# Patient Record
Sex: MALE | Race: WHITE | NOT HISPANIC OR LATINO | Employment: FULL TIME | URBAN - METROPOLITAN AREA
[De-identification: names, ages, dates, MRNs, and addresses within clinical notes are randomized per-mention and may not be internally consistent; named-entity substitution may affect disease eponyms.]

---

## 2020-11-17 ENCOUNTER — OFFICE VISIT - HEALTHEAST (OUTPATIENT)
Dept: FAMILY MEDICINE | Facility: CLINIC | Age: 45
End: 2020-11-17

## 2020-11-17 DIAGNOSIS — R05.9 COUGH: ICD-10-CM

## 2020-11-17 DIAGNOSIS — J32.0 MAXILLARY SINUSITIS, UNSPECIFIED CHRONICITY: ICD-10-CM

## 2020-11-17 DIAGNOSIS — Z71.6 ENCOUNTER FOR SMOKING CESSATION COUNSELING: ICD-10-CM

## 2020-11-17 RX ORDER — BENZONATATE 100 MG/1
100 CAPSULE ORAL EVERY 6 HOURS PRN
Qty: 30 CAPSULE | Refills: 0 | Status: SHIPPED | OUTPATIENT
Start: 2020-11-17

## 2020-11-17 RX ORDER — NICOTINE 21 MG/24HR
1 PATCH, TRANSDERMAL 24 HOURS TRANSDERMAL EVERY 24 HOURS
Qty: 30 PATCH | Refills: 0 | Status: SHIPPED | OUTPATIENT
Start: 2020-11-17

## 2020-11-17 RX ORDER — ACETAMINOPHEN 325 MG/1
650 TABLET ORAL EVERY 6 HOURS PRN
Status: SHIPPED | COMMUNITY
Start: 2020-11-17

## 2020-11-19 ENCOUNTER — COMMUNICATION - HEALTHEAST (OUTPATIENT)
Dept: SCHEDULING | Facility: CLINIC | Age: 45
End: 2020-11-19

## 2021-05-27 VITALS
RESPIRATION RATE: 16 BRPM | TEMPERATURE: 98.4 F | DIASTOLIC BLOOD PRESSURE: 78 MMHG | HEART RATE: 105 BPM | SYSTOLIC BLOOD PRESSURE: 119 MMHG | OXYGEN SATURATION: 95 %

## 2021-06-13 NOTE — PATIENT INSTRUCTIONS - HE
There were no signs of pneumonia.    Your symptoms are likely due to acute bronchitis.  This is inflammation of the tubes leading into the lungs, most often due to a viral infection and an antibiotic will not help this.    You may take Tessalon Perles every six hours as needed for cough.  May also try to use Mucinex or Robitussin to help for cough supressant.    Please monitor symptoms carefully.  If developing chest pain, shortness of breath, fever, coughing up blood, extreme fatigue, or any other new, concerning symptoms, come back to clinic or go to ER immediately.  Otherwise, if no improvement in symptoms in one week, follow-up with your primary care provider.    You will need to self-isolate until your COVID-19 test results are known.  If your results are negative, you can return to work.  If the results are positive, you will need to self-isolate for a period of 14 days from exposure date.    You will received a follow-up call to schedule an appointment with a primary provider.  You mention concern about COPD, and establishing care with a primary provider will help determine whether COPD is present or not.    You are prescribed Augmentin to be taken for treatment of sinusitis.  Please follow-up with primary provider to ensure that this condition is resolving as well.

## 2021-06-13 NOTE — PROGRESS NOTES
Chief Complaint   Patient presents with     Cough     [id number],      Fatigue     x1-1.5wks, was exposed to covid positive person at work last week, bodyaches, HA     Night Sweats     last night     Shortness of Breath     x2wks       HPI:    Jake Rodriguez is a 45 y.o. male who presents today [place] [facility] complaining of cough and shortness of breath for 2 months, increasing fatigue and body aches over the past 7 to 10 days, headache and night sweats last night.  He has not had a documented fever.  Patient denies sore throat, ear pain.  He does note that he has occasional chest pain when he has bouts of coughing, but that chest pain resolves when the coughing spell has ended.  The poatient has had a yellow-tinged sputum that has persisted for 2-months.  He does not have change in sense of taste or smell sensation; he states he has had decreased sense of smell for years.  Patient was treated for pneumonia on 10/18/2020, but notes that he did not receive a steroid with that treatment and wonders if that is why the symptoms have persisted.  He was treated with doxycycline and cough suppressant.  He feels that that treatment was ineffective, given that symptoms persist.      The patient notes that he had exposure to a coworker who tested positive for COVID about 8 days ago.  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      History obtained from the patient.    Problem List:  There are no relevant problems documented for this patient.      No past medical history on file.    Social History     Tobacco Use     Smoking status: Current Every Day Smoker     Types: Cigarettes     Smokeless tobacco: Never Used   Substance Use Topics     Alcohol use: Not on file       Review of Systems   Constitutional: Positive for fatigue.        Night sweats last night.  No fever documented.   HENT: Positive for congestion,  postnasal drip, rhinorrhea, sinus pressure and sinus pain. Negative for ear pain, sore throat, trouble swallowing and voice change.    Eyes: Negative for photophobia, discharge and redness.   Respiratory: Positive for cough and shortness of breath. Negative for chest tightness and wheezing.         Occasional chest pain that occurs with vigorous coughing.  No chest pain at this time.  Shortness of breath for two months has not increased recently.    Cardiovascular: Negative for palpitations.   Gastrointestinal: Negative for diarrhea and nausea.   Neurological: Positive for headaches. Negative for dizziness.        Headache last night which has resolved.    Hematological: Positive for adenopathy.   Psychiatric/Behavioral: Negative.        Vitals:    11/17/20 0903   BP: 119/78   Patient Site: Right Arm   Patient Position: Sitting   Cuff Size: Adult Regular   Pulse: (!) 105   Resp: 16   Temp: 98.4  F (36.9  C)   TempSrc: Oral   SpO2: 95%       Physical Exam  Constitutional:       Appearance: Normal appearance. He is not ill-appearing, toxic-appearing or diaphoretic.   HENT:      Head: Normocephalic and atraumatic.      Nose: Congestion and rhinorrhea present.      Comments: Reports yellow drainage from nose.  Yellow drainage in sputum following coughing episodes.      Mouth/Throat:      Mouth: Mucous membranes are moist.      Pharynx: Posterior oropharyngeal erythema present. No oropharyngeal exudate.   Eyes:      Conjunctiva/sclera: Conjunctivae normal.      Pupils: Pupils are equal, round, and reactive to light.   Neck:      Musculoskeletal: Normal range of motion and neck supple.   Cardiovascular:      Rate and Rhythm: Normal rate and regular rhythm.      Pulses: Normal pulses.      Heart sounds: Normal heart sounds. No murmur.   Pulmonary:      Effort: Pulmonary effort is normal.      Breath sounds: Normal breath sounds. No wheezing or rales.   Chest:      Chest wall: No tenderness.   Skin:     General: Skin is  warm and dry.      Capillary Refill: Capillary refill takes less than 2 seconds.   Neurological:      General: No focal deficit present.      Mental Status: He is alert.   Psychiatric:         Mood and Affect: Mood normal.         Labs:    COVID-19 results are pending.    Radiology:  I have personally ordered and preliminarily reviewed the following xray, I have noted that there are not findings indicating pneumonia for the   Xr Chest 2 Views    Result Date: 11/17/2020  EXAM DATE:         11/17/2020 EXAM: X-RAY CHEST, 2 VIEWS, FRONTAL AND LATERAL LOCATION: [facility] DATE/TIME: 11/17/2020 9:45 AM INDICATION: cough and sob x 2 months. Increased sputum, fatigue, and sweats more recently in the past 7 days. COMPARISON: PA and lateral views of the chest 10/18/2020 IMPRESSION: Negative chest.       Clinical Decision Making:    Differential diagnoses for this patient include upper respiratory infection, sinusitis, viral pharyngitis, pneumonia, and or COVID-19.  The patient has symptoms of cough, shortness of breath, and congestion for 2 months.  Because he has yellow-tinged sputum intranasal secretions because patient has tenderness over maxillary sinuses bilaterally we will treat the patient for sinusitis with an Augmentin regimen.  The patient is interested in smoking cessation which he will broach with a primary provider. In the meantime he would like 2 separate doses for nicotine patches, as he has had experience of having too much nicotine with the 21 mg patch.  He has been treated previously for sinusitis is and is aware of the antibiotic regimen plan.  He is prescribed a 5-day dose of prednisone.  Finally the patient is aware of the importance of establishing care with a primary provider in order to follow-up on symptoms noted during this examination and to address the need for smoking cessation and monitoring for the possibility of COPD condition.    At the end of the encounter, I discussed  results, diagnosis, medications. Discussed red flags for immediate return to clinic/ER, as well as indications for follow up if no improvement. Patient understood and agreed to plan. Patient was stable for discharge.    1. Cough  Symptomatic COVID-19 Virus (CORONAVIRUS) PCR    XR Chest 2 Views    nicotine (NICODERM CQ) 21 mg/24 hr    predniSONE (DELTASONE) 20 MG tablet    benzonatate (TESSALON PERLES) 100 MG capsule    nicotine (NICODERM CQ) 14 mg/24 hr    amoxicillin-clavulanate (AUGMENTIN) 875-125 mg per tablet   2. Encounter for smoking cessation counseling  nicotine (NICODERM CQ) 14 mg/24 hr    Ambulatory referral to Family Practice   3. Maxillary sinusitis, unspecified chronicity  amoxicillin-clavulanate (AUGMENTIN) 875-125 mg per tablet         Patient Instructions   There were no signs of pneumonia.    Your symptoms are likely due to acute bronchitis.  This is inflammation of the tubes leading into the lungs, most often due to a viral infection and an antibiotic will not help this.    You may take Tessalon Perles every six hours as needed for cough.  May also try to use Mucinex or Robitussin to help for cough supressant.    Please monitor symptoms carefully.  If developing chest pain, shortness of breath, fever, coughing up blood, extreme fatigue, or any other new, concerning symptoms, come back to clinic or go to ER immediately.  Otherwise, if no improvement in symptoms in one week, follow-up with your primary care provider.    You will need to self-isolate until your COVID-19 test results are known.  If your results are negative, you can return to work.  If the results are positive, you will need to self-isolate for a period of 14 days from exposure date.    You will received a follow-up call to schedule an appointment with a primary provider.  You mention concern about COPD, and establishing care with a primary provider will help determine whether COPD is present or not.    You are prescribed Augmentin to  be taken for treatment of sinusitis.  Please follow-up with primary provider to ensure that this condition is resolving as well.

## 2021-06-20 ENCOUNTER — HEALTH MAINTENANCE LETTER (OUTPATIENT)
Age: 46
End: 2021-06-20

## 2021-10-11 ENCOUNTER — HEALTH MAINTENANCE LETTER (OUTPATIENT)
Age: 46
End: 2021-10-11

## 2022-07-17 ENCOUNTER — HEALTH MAINTENANCE LETTER (OUTPATIENT)
Age: 47
End: 2022-07-17

## 2022-09-24 ENCOUNTER — HEALTH MAINTENANCE LETTER (OUTPATIENT)
Age: 47
End: 2022-09-24

## 2022-11-10 ENCOUNTER — VIRTUAL VISIT (OUTPATIENT)
Dept: PSYCHOLOGY | Facility: CLINIC | Age: 47
End: 2022-11-10
Payer: COMMERCIAL

## 2022-11-10 DIAGNOSIS — Z91.199 NO-SHOW FOR APPOINTMENT: Primary | ICD-10-CM

## 2023-08-05 ENCOUNTER — HEALTH MAINTENANCE LETTER (OUTPATIENT)
Age: 48
End: 2023-08-05

## 2023-11-06 ENCOUNTER — HOSPITAL ENCOUNTER (EMERGENCY)
Facility: HOSPITAL | Age: 48
Discharge: HOME | End: 2023-11-06
Attending: EMERGENCY MEDICINE | Admitting: EMERGENCY MEDICINE
Payer: COMMERCIAL

## 2023-11-06 VITALS
RESPIRATION RATE: 20 BRPM | HEART RATE: 105 BPM | DIASTOLIC BLOOD PRESSURE: 93 MMHG | WEIGHT: 172.3 LBS | HEIGHT: 67 IN | BODY MASS INDEX: 27.04 KG/M2 | TEMPERATURE: 97.9 F | OXYGEN SATURATION: 98 % | SYSTOLIC BLOOD PRESSURE: 138 MMHG

## 2023-11-06 DIAGNOSIS — B02.9 HERPES ZOSTER WITHOUT COMPLICATION: ICD-10-CM

## 2023-11-06 PROCEDURE — 99284 EMERGENCY DEPT VISIT MOD MDM: CPT

## 2023-11-06 RX ORDER — OXYCODONE AND ACETAMINOPHEN 5; 325 MG/1; MG/1
1 TABLET ORAL EVERY 6 HOURS PRN
Qty: 12 TABLET | Refills: 0 | Status: SHIPPED | OUTPATIENT
Start: 2023-11-06 | End: 2023-11-09

## 2023-11-06 RX ORDER — VALACYCLOVIR HYDROCHLORIDE 1 G/1
1000 TABLET, FILM COATED ORAL 3 TIMES DAILY
Qty: 21 TABLET | Refills: 0 | Status: SHIPPED | OUTPATIENT
Start: 2023-11-06 | End: 2023-11-13

## 2023-11-06 NOTE — ED PROVIDER NOTES
EMERGENCY DEPARTMENT ENCOUNTER      NAME: Jake Rodriguez  AGE: 48 year old male  YOB: 1975  MRN: [id number]  EVALUATION DATE & TIME: No admission date for patient encounter.    PCP: System, Provider Not In    ED PROVIDER: Jaime Mascorro D.O.      Chief Complaint   Patient presents with    Rash    Flank Pain       FINAL IMPRESSION:  No diagnosis found.    ED COURSE & MEDICAL DECISION MAKIN:28 PM I met with the patient to gather history and to perform my initial exam. I discussed the plan for care while in the Emergency Department.         Pertinent Labs & Imaging studies reviewed. (See chart for details)  48 year old male presents to the Emergency Department for evaluation of right flank pain.  Initial differential did include appendicitis, ureterolithiasis, zoster, other acute process.  Patient does have a vesicular dermatomal rash on the right flank, most consistent with herpes zoster/shingles infection.  No systemic symptoms.  At this time, believe the patient is appropriate for discharge on Valtrex and pain control and have follow-up as an outpatient with primary care provider.  Return precautions were discussed.  Did provide work note for the next several days.    Medical Decision Making    History:  Supplemental history from: Documented in chart, if applicable  External Record(s) reviewed: Documented in chart, if applicable.    Work Up:  Chart documentation includes differential considered and any EKGs or imaging independently interpreted by provider, where specified.  In additional to work up documented, I considered the following work up: Documented in chart, if applicable.    External consultation:  Discussion of management with another provider: Documented in chart, if applicable    Complicating factors:  Care impacted by chronic illness: N/A  Care affected by social determinants of health: N/A    Disposition considerations: Discharge. I prescribed additional prescription strength  "medication(s) as charted. See documentation for any additional details.        At the conclusion of the encounter I discussed the results of all of the tests and the disposition. The questions were answered. The patient or family acknowledged understanding and was agreeable with the care plan.        HPI    Patient information was obtained from: the patient      Jake Rodriguez is a 48 year old male who presents to the emergency room with complaint of right-sided flank pain.  Noticed pain several days ago, 2 days ago started developing a rash on the right flank.  Does report that he has had chickenpox at age 27 from his mother that had zoster at the time.  Denies any chest pain or shortness of breath.  Does not have dysuria or hematuria.  Denies additional plaints at this time.        PAST MEDICAL HISTORY:  No past medical history on file.    PAST SURGICAL HISTORY:  No past surgical history on file.      CURRENT MEDICATIONS:    No current facility-administered medications for this encounter.     Current Outpatient Medications   Medication    acetaminophen (TYLENOL) 325 MG tablet    benzonatate (TESSALON PERLES) 100 MG capsule    nicotine (NICODERM CQ) 21 mg/24 hr         ALLERGIES:  Allergies   Allergen Reactions    Metaproterenol Other (See Comments)     Other reaction(s): *Unknown - Childhood Rxn, Unknown, pt doesn't recall.       FAMILY HISTORY:  No family history on file.    SOCIAL HISTORY:  Social History     Socioeconomic History    Marital status:    Tobacco Use    Smoking status: Every Day     Types: Cigarettes    Smokeless tobacco: Never       VITALS:  Patient Vitals for the past 24 hrs:   BP Temp Temp src Pulse Resp SpO2 Height Weight   11/06/23 1132 (!) 138/93 97.9  F (36.6  C) Oral 105 20 98 % -- --   11/06/23 1127 (!) 157/74 98  F (36.7  C) Oral 81 20 92 % 1.702 m (5' 7\") 78.2 kg (172 lb 4.8 oz)       PHYSICAL EXAM    Vitals: BP (!) 138/93   Pulse 105   Temp 97.9  F (36.6  C) (Oral)   Resp " "20   Ht 1.702 m (5' 7\")   Wt 78.2 kg (172 lb 4.8 oz)   SpO2 98%   BMI 26.99 kg/m    General: Appears in no acute distress, awake, alert, interactive.  Eyes: Conjunctivae non-injected. Sclera anicteric.  HENT: Atraumatic, normocephalic  Neck: Supple, normal ROM  Respiratory/Chest: Respiration unlabored, no tachypnea  Abdomen: non distended  Musculoskeletal: Normal extremities. No edema or erythema.  Skin: Dermatomal vesicular rash of the right flank   Neurologic: Alert and oriented, face symmetric, moves all extremities spontaneously. Speech clear.          MEDICATIONS GIVEN IN THE EMERGENCY:  Medications - No data to display    NEW PRESCRIPTIONS STARTED AT TODAY'S ER VISIT  New Prescriptions    No medications on file          Jaime Mascorro D.O.  Emergency Medicine  [facility] EMERGENCY DEPARTMENT  [address] [place] [id number] [phone]  Dept: [phone]       Jaime Mascorro,   11/06/23 1234    "

## 2023-11-06 NOTE — ED TRIAGE NOTES
Presents with red rash that wraps around the right flank, quite painful at times but currently rated 4/10. Concerned for shingles. Tiny open area noted on the rash lesion noted nearest the umbilicus. Rash present x 4 days.     Of note patient also had a fall out of bed on Wednesday and has contusion under the right axilla and may have aggrivated that area on Saturday while playing basketball; denies continued pain related to this.

## 2023-11-06 NOTE — Clinical Note
Jake Rodriguez was seen and treated in our emergency department on 11/6/2023.  He may return to work on 11/09/2023.       If you have any questions or concerns, please don't hesitate to call.      Jaime Mascorro, DO

## 2024-09-22 ENCOUNTER — HEALTH MAINTENANCE LETTER (OUTPATIENT)
Age: 49
End: 2024-09-22

## 2024-11-05 ENCOUNTER — OFFICE VISIT (OUTPATIENT)
Dept: FAMILY MEDICINE | Facility: CLINIC | Age: 49
End: 2024-11-05
Payer: COMMERCIAL

## 2024-11-05 VITALS
HEART RATE: 102 BPM | WEIGHT: 254.5 LBS | SYSTOLIC BLOOD PRESSURE: 110 MMHG | RESPIRATION RATE: 18 BRPM | HEIGHT: 74 IN | DIASTOLIC BLOOD PRESSURE: 80 MMHG | BODY MASS INDEX: 32.66 KG/M2 | TEMPERATURE: 97.6 F | OXYGEN SATURATION: 98 %

## 2024-11-05 DIAGNOSIS — R06.02 SHORT OF BREATH ON EXERTION: ICD-10-CM

## 2024-11-05 DIAGNOSIS — Z12.11 SCREEN FOR COLON CANCER: ICD-10-CM

## 2024-11-05 DIAGNOSIS — Z00.00 ROUTINE GENERAL MEDICAL EXAMINATION AT A HEALTH CARE FACILITY: Primary | ICD-10-CM

## 2024-11-05 DIAGNOSIS — Z11.59 NEED FOR HEPATITIS B SCREENING TEST: ICD-10-CM

## 2024-11-05 DIAGNOSIS — M79.89 LOCALIZED SWELLING OF LOWER EXTREMITY: ICD-10-CM

## 2024-11-05 DIAGNOSIS — Z83.6 FAMILY HISTORY OF PULMONARY FIBROSIS: ICD-10-CM

## 2024-11-05 DIAGNOSIS — Z11.4 SCREENING FOR HIV (HUMAN IMMUNODEFICIENCY VIRUS): ICD-10-CM

## 2024-11-05 DIAGNOSIS — F19.90 SUBSTANCE USE DISORDER: ICD-10-CM

## 2024-11-05 DIAGNOSIS — R19.8 TENESMUS: ICD-10-CM

## 2024-11-05 DIAGNOSIS — K64.8 INTERNAL HEMORRHOID: ICD-10-CM

## 2024-11-05 DIAGNOSIS — Z11.59 NEED FOR HEPATITIS C SCREENING TEST: ICD-10-CM

## 2024-11-05 DIAGNOSIS — R00.0 TACHYCARDIA: ICD-10-CM

## 2024-11-05 DIAGNOSIS — Z72.0 NICOTINE USE: ICD-10-CM

## 2024-11-05 LAB
ANION GAP SERPL CALCULATED.3IONS-SCNC: 10 MMOL/L (ref 7–15)
BUN SERPL-MCNC: 13.5 MG/DL (ref 6–20)
CALCIUM SERPL-MCNC: 9.1 MG/DL (ref 8.8–10.4)
CHLORIDE SERPL-SCNC: 103 MMOL/L (ref 98–107)
CHOLEST SERPL-MCNC: 165 MG/DL
CREAT SERPL-MCNC: 1.09 MG/DL (ref 0.67–1.17)
EGFRCR SERPLBLD CKD-EPI 2021: 84 ML/MIN/1.73M2
FASTING STATUS PATIENT QL REPORTED: YES
GLUCOSE SERPL-MCNC: 99 MG/DL (ref 70–99)
GLUCOSE SERPL-MCNC: 99 MG/DL (ref 70–99)
HBV SURFACE AB SERPL IA-ACNC: <3.5 M[IU]/ML
HBV SURFACE AB SERPL IA-ACNC: NONREACTIVE M[IU]/ML
HCO3 SERPL-SCNC: 25 MMOL/L (ref 22–29)
HCV AB SERPL QL IA: NONREACTIVE
HDLC SERPL-MCNC: 33 MG/DL
HIV 1+2 AB+HIV1 P24 AG SERPL QL IA: NONREACTIVE
LDLC SERPL CALC-MCNC: 106 MG/DL
NONHDLC SERPL-MCNC: 132 MG/DL
NT-PROBNP SERPL-MCNC: 101 PG/ML (ref 0–450)
POTASSIUM SERPL-SCNC: 4.1 MMOL/L (ref 3.4–5.3)
SODIUM SERPL-SCNC: 138 MMOL/L (ref 135–145)
TRIGL SERPL-MCNC: 132 MG/DL

## 2024-11-05 PROCEDURE — 83880 ASSAY OF NATRIURETIC PEPTIDE: CPT

## 2024-11-05 PROCEDURE — 87389 HIV-1 AG W/HIV-1&-2 AB AG IA: CPT

## 2024-11-05 PROCEDURE — 36415 COLL VENOUS BLD VENIPUNCTURE: CPT

## 2024-11-05 PROCEDURE — 86803 HEPATITIS C AB TEST: CPT

## 2024-11-05 PROCEDURE — 80048 BASIC METABOLIC PNL TOTAL CA: CPT

## 2024-11-05 PROCEDURE — 80061 LIPID PANEL: CPT

## 2024-11-05 PROCEDURE — 99214 OFFICE O/P EST MOD 30 MIN: CPT | Mod: 25

## 2024-11-05 PROCEDURE — 86706 HEP B SURFACE ANTIBODY: CPT

## 2024-11-05 PROCEDURE — 99386 PREV VISIT NEW AGE 40-64: CPT

## 2024-11-05 RX ORDER — BUPROPION HYDROCHLORIDE 150 MG/1
150 TABLET ORAL EVERY MORNING
Qty: 90 TABLET | Refills: 2 | Status: SHIPPED | OUTPATIENT
Start: 2024-11-05 | End: 2025-02-03

## 2024-11-05 RX ORDER — NICOTINE 21 MG/24HR
1 PATCH, TRANSDERMAL 24 HOURS TRANSDERMAL EVERY 24 HOURS
Qty: 90 PATCH | Refills: 1 | Status: SHIPPED | OUTPATIENT
Start: 2024-11-05 | End: 2025-05-04

## 2024-11-05 SDOH — HEALTH STABILITY: PHYSICAL HEALTH: ON AVERAGE, HOW MANY MINUTES DO YOU ENGAGE IN EXERCISE AT THIS LEVEL?: 60 MIN

## 2024-11-05 SDOH — HEALTH STABILITY: PHYSICAL HEALTH: ON AVERAGE, HOW MANY DAYS PER WEEK DO YOU ENGAGE IN MODERATE TO STRENUOUS EXERCISE (LIKE A BRISK WALK)?: 2 DAYS

## 2024-11-05 ASSESSMENT — SOCIAL DETERMINANTS OF HEALTH (SDOH): HOW OFTEN DO YOU GET TOGETHER WITH FRIENDS OR RELATIVES?: THREE TIMES A WEEK

## 2024-11-05 NOTE — PATIENT INSTRUCTIONS
Thank you for seeing us at  [facility].     To Review:  If you are getting lab work today, we will send you a ComVibehart message with recommendations once these are all returned to us.  X-rays are able to be performed in clinic and we will notify you of the results.  Any other imaging is scheduled and you will be contacted by the scheduling department.  If you do not hear from them in 2 weeks, please call [phone]   If you are getting immunizations today, you may have some arm soreness; you can use tylenol or ibuprofen for this.    An E visit is an excellent way to get quick evaluation from myself. These can be completed using the [employer] or online using [email or web address]. We can evaluate a variety of conditions using this including sinusitis, skin conditions, etc. Please send us a [email or web address] Message or call if having issues or questions.    Ru Cho DO, MS  [facility]  [phone]    Patient Education   Preventive Care Advice   This is general advice given by our system to help you stay healthy. However, your care team may have specific advice just for you. Please talk to your care team about your preventive care needs.  Nutrition  Eat 5 or more servings of fruits and vegetables each day.  Try wheat bread, brown rice and whole grain pasta (instead of white bread, rice, and pasta).  Get enough calcium and vitamin D. Check the label on foods and aim for 100% of the RDA (recommended daily allowance).  Lifestyle  Exercise at least 150 minutes each week  (30 minutes a day, 5 days a week).  Do muscle strengthening activities 2 days a week. These help control your weight and prevent disease.  No smoking.  Wear sunscreen to prevent skin cancer.  Have a dental exam and cleaning every 6 months.  Yearly exams  See your health care team every year to talk about:  Any changes in your health.  Any medicines your care team has prescribed.  Preventive care, family planning, and  ways to prevent chronic diseases.  Shots (vaccines)   HPV shots (up to age 26), if you've never had them before.  Hepatitis B shots (up to age 59), if you've never had them before.  COVID-19 shot: Get this shot when it's due.  Flu shot: Get a flu shot every year.  Tetanus shot: Get a tetanus shot every 10 years.  Pneumococcal, hepatitis A, and RSV shots: Ask your care team if you need these based on your risk.  Shingles shot (for age 50 and up)  General health tests  Diabetes screening:  Starting at age 35, Get screened for diabetes at least every 3 years.  If you are younger than age 35, ask your care team if you should be screened for diabetes.  Cholesterol test: At age 39, start having a cholesterol test every 5 years, or more often if advised.  Bone density scan (DEXA): At age 50, ask your care team if you should have this scan for osteoporosis (brittle bones).  Hepatitis C: Get tested at least once in your life.  STIs (sexually transmitted infections)  Before age 24: Ask your care team if you should be screened for STIs.  After age 24: Get screened for STIs if you're at risk. You are at risk for STIs (including HIV) if:  You are sexually active with more than one person.  You don't use condoms every time.  You or a partner was diagnosed with a sexually transmitted infection.  If you are at risk for HIV, ask about PrEP medicine to prevent HIV.  Get tested for HIV at least once in your life, whether you are at risk for HIV or not.  Cancer screening tests  Cervical cancer screening: If you have a cervix, begin getting regular cervical cancer screening tests starting at age 21.  Breast cancer scan (mammogram): If you've ever had breasts, begin having regular mammograms starting at age 40. This is a scan to check for breast cancer.  Colon cancer screening: It is important to start screening for colon cancer at age 45.  Have a colonoscopy test every 10 years (or more often if you're at risk) Or, ask your provider  about stool tests like a FIT test every year or Cologuard test every 3 years.  To learn more about your testing options, visit:   .  For help making a decision, visit:   https://bit.ly/ql16700.  Prostate cancer screening test: If you have a prostate, ask your care team if a prostate cancer screening test (PSA) at age 55 is right for you.  Lung cancer screening: If you are a current or former smoker ages 50 to 80, ask your care team if ongoing lung cancer screenings are right for you.  For informational purposes only. Not to replace the advice of your health care provider. Copyright   2023 [employer]. All rights reserved. Clinically reviewed by the [employer] [facility]. Exercise.com [id number] - REV 01/24.  Learning About Stress  What is stress?     Stress is your body's response to a hard situation. Your body can have a physical, emotional, or mental response. Stress is a fact of life for most people, and it affects everyone differently. What causes stress for you may not be stressful for someone else.  A lot of things can cause stress. You may feel stress when you go on a job interview, take a test, or run a race. This kind of short-term stress is normal and even useful. It can help you if you need to work hard or react quickly. For example, stress can help you finish an important job on time.  Long-term stress is caused by ongoing stressful situations or events. Examples of long-term stress include long-term health problems, ongoing problems at work, or conflicts in your family. Long-term stress can harm your health.  How does stress affect your health?  When you are stressed, your body responds as though you are in danger. It makes hormones that speed up your heart, make you breathe faster, and give you a burst of energy. This is called the fight-or-flight stress response. If the stress is over quickly, your body goes back to normal and no harm is done.  But if stress happens too often or  lasts too long, it can have bad effects. Long-term stress can make you more likely to get sick, and it can make symptoms of some diseases worse. If you tense up when you are stressed, you may develop neck, shoulder, or low back pain. Stress is linked to high blood pressure and heart disease.  Stress also harms your emotional health. It can make you buckley, tense, or depressed. Your relationships may suffer, and you may not do well at work or school.  What can you do to manage stress?  You can try these things to help manage stress:   Do something active. Exercise or activity can help reduce stress. Walking is a great way to get started. Even everyday activities such as housecleaning or yard work can help.  Try yoga or shyanne chi. These techniques combine exercise and meditation. You may need some training at first to learn them.  Do something you enjoy. For example, listen to music or go to a movie. Practice your hobby or do volunteer work.  Meditate. This can help you relax, because you are not worrying about what happened before or what may happen in the future.  Do guided imagery. Imagine yourself in any setting that helps you feel calm. You can use online videos, books, or a teacher to guide you.  Do breathing exercises. For example:  From a standing position, bend forward from the waist with your knees slightly bent. Let your arms dangle close to the floor.  Breathe in slowly and deeply as you return to a standing position. Roll up slowly and lift your head last.  Hold your breath for just a few seconds in the standing position.  Breathe out slowly and bend forward from the waist.  Let your feelings out. Talk, laugh, cry, and express anger when you need to. Talking with supportive friends or family, a counselor, or a todd leader about your feelings is a healthy way to relieve stress. Avoid discussing your feelings with people who make you feel worse.  Write. It may help to write about things that are bothering  "you. This helps you find out how much stress you feel and what is causing it. When you know this, you can find better ways to cope.  What can you do to prevent stress?  You might try some of these things to help prevent stress:  Manage your time. This helps you find time to do the things you want and need to do.  Get enough sleep. Your body recovers from the stresses of the day while you are sleeping.  Get support. Your family, friends, and community can make a difference in how you experience stress.  Limit your news feed. Avoid or limit time on social media or news that may make you feel stressed.  Do something active. Exercise or activity can help reduce stress. Walking is a great way to get started.  Where can you learn more?  Go to https://www.[employer]net/patiented  Enter N032 in the search box to learn more about \"Learning About Stress.\"  Current as of: October 24, 2023  Content Version: 14.2 2024 [employer].   Care instructions adapted under license by your healthcare professional. If you have questions about a medical condition or this instruction, always ask your healthcare professional. Healthwise, Incorporated disclaims any warranty or liability for your use of this information.    Substance Use Disorder: Care Instructions  Overview     You can improve your life and health by stopping your use of alcohol or drugs. When you don't drink or use drugs, you may feel and sleep better. You may get along better with your family, friends, and coworkers. There are medicines and programs that can help with substance use disorder.  How can you care for yourself at home?  Here are some ways to help you stay sober and prevent relapse.  If you have been given medicine to help keep you sober or reduce your cravings, be sure to take it exactly as prescribed.  Talk to your doctor about programs that can help you stop using drugs or drinking alcohol.  Do not keep alcohol or drugs in your home.  Plan ahead. " Think about what you'll say if other people ask you to drink or use drugs. Try not to spend time with people who drink or use drugs.  Use the time and money spent on drinking or drugs to do something that's important to you.  Preventing a relapse  Have a plan to deal with relapse. Learn to recognize changes in your thinking that lead you to drink or use drugs. Get help before you start to drink or use drugs again.  Try to stay away from situations, friends, or places that may lead you to drink or use drugs.  If you feel the need to drink alcohol or use drugs again, seek help right away. Call a trusted friend or family member. Some people get support from organizations such as Narcotics Anonymous or [employer] or from treatment facilities.  If you relapse, get help as soon as you can. Some people make a plan with another person that outlines what they want that person to do for them if they relapse. The plan usually includes how to handle the relapse and who to notify in case of relapse.  Don't give up. Remember that a relapse doesn't mean that you have failed. Use the experience to learn the triggers that lead you to drink or use drugs. Then quit again. Recovery is a lifelong process. Many people have several relapses before they are able to quit for good.  Follow-up care is a key part of your treatment and safety. Be sure to make and go to all appointments, and call your doctor if you are having problems. It's also a good idea to know your test results and keep a list of the medicines you take.  When should you call for help?   Call 911  anytime you think you may need emergency care. For example, call if you or someone else:    Has overdosed or has withdrawal signs. Be sure to tell the emergency workers that you are or someone else is using or trying to quit using drugs. Overdose or withdrawal signs may include:  Losing consciousness.  Seizure.  Seeing or hearing things that aren't there (hallucinations).      "Is thinking or talking about suicide or harming others.   Where to get help 24 hours a day, 7 days a week   If you or someone you know talks about suicide, self-harm, a mental health crisis, a substance use crisis, or any other kind of emotional distress, get help right away. You can:    Call the Suicide and Crisis Lifeline at 988.     Call 9-685-222-TALK (1-920.872.8618).     Text HOME to 194200 to access the Crisis Text Line.   Consider saving these numbers in your phone.  Go to [employer] for more information or to chat online.  Call your doctor now or seek immediate medical care if:    You are having withdrawal symptoms. These may include nausea or vomiting, sweating, shakiness, and anxiety.   Watch closely for changes in your health, and be sure to contact your doctor if:    You have a relapse.     You need more help or support to stop.   Where can you learn more?  Go to https://www.[employer].net/patiented  Enter H573 in the search box to learn more about \"Substance Use Disorder: Care Instructions.\"  Current as of: November 15, 2023  Content Version: 14.2 2024 [email or web address].   Care instructions adapted under license by your healthcare professional. If you have questions about a medical condition or this instruction, always ask your healthcare professional. Healthwise, Incorporated disclaims any warranty or liability for your use of this information.    Safer Sex: Care Instructions  Overview  Safer sex is a way to reduce your risk of getting a sexually transmitted infection (STI). It can also help prevent pregnancy.  Several products can help you practice safer sex and reduce your chance of STIs. One of the best is a condom. There are internal and external condoms. You can use a special rubber sheet (dental dam) for protection during oral sex. Disposable gloves can keep your hands from touching blood, semen, or other body fluids that can carry infections.  Remember that birth control methods " such as diaphragms, IUDs, foams, and birth control pills do not stop you from getting STIs.  Follow-up care is a key part of your treatment and safety. Be sure to make and go to all appointments, and call your doctor if you are having problems. It's also a good idea to know your test results and keep a list of the medicines you take.  How can you care for yourself at home?  Think about getting vaccinated to help prevent hepatitis A, hepatitis B, and human papillomavirus (HPV). They can be spread through sex.  Use a condom every time you have sex. Use an external condom, which goes on the penis. Or use an internal condom, which goes into the vagina or anus.  Make sure you use the right size external condom. A condom that's too small can break easily. A condom that's too big can slip off during sex.  Use a new condom each time you have sex. Be careful not to poke a hole in the condom when you open the wrapper.  Don't use an internal condom and an external condom at the same time.  Never use petroleum jelly (such as Vaseline), grease, hand lotion, baby oil, or anything with oil in it. These products can make holes in the condom.  After intercourse, hold the edge of the condom as you remove it. This will help keep semen from spilling out of the condom.  Do not have sex with anyone who has symptoms of an STI, such as sores on the genitals or mouth.  Do not drink a lot of alcohol or use drugs before sex.  Limit your sex partners. Sex with one partner who has sex only with you can reduce your risk of getting an STI.  Don't share sex toys. But if you do share them, use a condom and clean the sex toys between each use.  Talk to any partners before you have sex. Talk about what you feel comfortable with and whether you have any boundaries with sex. And find out if your partner or partners may be at risk for any STI. Keep in mind that a person may be able to spread an STI even if they do not have symptoms. You and any partners  "may want to get tested for STIs.  Where can you learn more?  Go to https://www.[employer].net/patiented  Enter B608 in the search box to learn more about \"Safer Sex: Care Instructions.\"  Current as of: November 27, 2023  Content Version: 14.2 2024 [facility].   Care instructions adapted under license by your healthcare professional. If you have questions about a medical condition or this instruction, always ask your healthcare professional. Healthwise, Incorporated disclaims any warranty or liability for your use of this information.       "

## 2024-11-05 NOTE — PROGRESS NOTES
Preventive Care Visit  [facility]  Ru Cho DO, Family Medicine  Nov 5, 2024      Assessment & Plan     Routine general medical examination at a health care facility  Screening for HIV (human immunodeficiency virus)  Need for hepatitis C screening test  Patient is a 49-year-old male here to establish care  He has acute complaints below  For preventative we will place an order for colonoscopy  Discussed tobacco cessation  Will check A1c, HIV, hepatitis C, lipids  We will send patient a PlayDo message with results  - REVIEW OF HEALTH MAINTENANCE PROTOCOL ORDERS  - Glucose; Future  - Lipid panel reflex to direct LDL Fasting; Future  - HIV Antigen Antibody Combo  - Hepatitis C Screen Reflex to HCV RNA Quant and Genotype    Family history of pulmonary fibrosis  Short of breath on exertion  Localized swelling of lower extremity  Tachycardia  Patient has had Lower extremity swelling with shortness of breath on exertion for the last few years  He does report that he got COVID and he was stuck with a cough for 5 or 6 months  He has a family history of pulmonary fibrosis in his dad around the same age of when he was diagnosed  On physical exam today he does have +1 to +2 swelling to the mid shins  No murmurs are heard however he is tachycardic  I am concerned that the patient may have a component of congestive heart failure versus pulmonary hypertension secondary to pulmonary fibrosis  Will get an echocardiogram to determine EF versus pulmonary arterial pressures  Depending on results of echocardiogram we will send to the appropriate specialists  Discussed with the patient does need to stop using amphetamines as this can cause dilated cardiomyopathy  Smoking could exacerbate his pulmonary disease  - Echocardiogram Complete; Future  - Basic metabolic panel; Future  - BNP-N terminal pro; Future  - Basic metabolic panel  - BNP-N terminal pro    Tenesmus  Internal hemorrhoid  Screen for colon  cancer  Patient has a 20-year history of feeling incomplete rectal emptying  Says that there is occasional straining  No pain associated  Occasional blood around the outside of the bowel movement  Loose to hard stools without diarrhea  Differential diagnosis of IBS versus internal hemorrhoids due to no pain  He is of age that he can get colon cancer screening so we will send him for a colonoscopy  He can try MiraLAX or Metamucil as he has not tried these in the past  - Colonoscopy Screening  Referral; Future    Need for hepatitis B screening test  No known vaccination from Hepatitis B  Will check for antibodies to ensure if immune  If negative, will encourage restarting series    - Hepatitis B Surface Antibody; Future  - Hepatitis B Surface Antibody    Substance use disorder  Nicotine use  Patient currently smokes three quarters of a pack  Previously smoked a pack a day  He is using amphetamines nearly daily whether smoking or eating them  Discussed dangers of this especially with the current concern of pulmonary fibrosis versus congestive heart failure  Preventive start Wellbutrin he will take 1 tablet/day for a week and then increase to 2 tablets a day for a week  We will also start him on nicotine patches as these have helped in the past  This will help with both activating symptoms of amphetamines as well as the psychological component  Discussed NA or therapist that he wants to refrain from these at this time  - buPROPion (WELLBUTRIN XL) 150 MG 24 hr tablet; Take 1 tablet (150 mg) by mouth every morning.    Patient has been advised of split billing requirements and indicates understanding: Yes        Nicotine/Tobacco Cessation  He reports that he has been smoking cigarettes. He started smoking about 32 years ago. He has a 16.4 pack-year smoking history. He has been exposed to tobacco smoke. He has never used smokeless tobacco.  Nicotine/Tobacco Cessation Plan  Pharmacotherapies : bupropion (Zyban) and  "Nicotine patch      BMI  Estimated body mass index is 32.66 kg/m  as calculated from the following:    Height as of this encounter: 1.88 m (6' 2.02\").    Weight as of this encounter: 115.4 kg (254 lb 8 oz).       Counseling  Appropriate preventive services were addressed with this patient via screening, questionnaire, or discussion as appropriate for fall prevention, nutrition, physical activity, Tobacco-use cessation, social engagement, weight loss and cognition.  Checklist reviewing preventive services available has been given to the patient.  Reviewed patient's diet, addressing concerns and/or questions.   He is at risk for lack of exercise and has been provided with information to increase physical activity for the benefit of his well-being.   The patient was instructed to see the dentist every 6 months.       Devonte Joseph is a 49 year old, presenting for the following:  Physical (Fasting. Referral for colonoscopy )        11/5/2024     9:53 AM   Additional Questions   Roomed by loly JONES    Bowel Movements  DESCRIPTION: feeling of not being done or complete, feels like something is in the way. No pain. Occasionally dab of blood but never pooling. Occasional straining. Having a bowel movement every day. Sore by the time he is done. Going 1-2 times per day  DURATION: 20 years  ASSOCIATION SYMPTOMS: straining, blood on outside of stool. Intermixed bristol. No diarrhea problems.  RELIEF TRIALS: no miralax or metamucil  PMH: no family history of colon cancer    Leg Swelling:  Got covid   Cough stuck with him for 5-6 months  Calves and legs swell - swelling all the time - last few years - compression socks and elevation help - make it feel better  Shortness of breath - for years  No chronic cough  No sleeping in chair or needing excessive pillows       5 years - 2 kiddos at home - happy at home   at [facility] Directive  Patient does not have a Health Care " Directive: Discussed advance care planning with patient; however, patient declined at this time.      11/5/2024   General Health   How would you rate your overall physical health? (!) FAIR   Feel stress (tense, anxious, or unable to sleep) Very much      (!) STRESS CONCERN      11/5/2024   Nutrition   Three or more servings of calcium each day? (!) NO   Diet: Other   If other, please elaborate: junk   How many servings of fruit and vegetables per day? (!) 0-1   How many sweetened beverages each day? (!) 4+            11/5/2024   Exercise   Days per week of moderate/strenous exercise 2 days   Average minutes spent exercising at this level 60 min      (!) EXERCISE CONCERN      11/5/2024   Social Factors   Frequency of gathering with friends or relatives Three times a week   Worry food won't last until get money to buy more No   Food not last or not have enough money for food? No   Do you have housing? (Housing is defined as stable permanent housing and does not include staying ouside in a car, in a tent, in an abandoned building, in an overnight shelter, or couch-surfing.) Yes   Are you worried about losing your housing? No   Lack of transportation? No   Unable to get utilities (heat,electricity)? No            11/5/2024   Dental   Dentist two times every year? (!) NO            11/5/2024   TB Screening   Were you born outside of the [place]? No              Today's PHQ-2 Score:       11/5/2024    10:02 AM   PHQ-2 ( 1999 [employer])   Q1: Little interest or pleasure in doing things 0    Q2: Feeling down, depressed or hopeless 1    PHQ-2 Score 1    Q1: Little interest or pleasure in doing things Not at all   Q2: Feeling down, depressed or hopeless Several days   PHQ-2 Score 1       Patient-reported         11/5/2024   Substance Use   Alcohol more than 3/day or more than 7/wk No   Do you use any other substances recreationally? (!) CANNABIS PRODUCTS        Social History     Tobacco Use    Smoking status: Every Day     Types:  "Cigarettes     Passive exposure: Current    Smokeless tobacco: Never   Vaping Use    Vaping status: Never Used             11/5/2024   One time HIV Screening   Previous HIV test? Yes          11/5/2024   STI Screening   New sexual partner(s) since last STI/HIV test? (!) YES       ASCVD Risk   The ASCVD Risk score (Aliya CALDERON, et al., 2019) failed to calculate for the following reasons:    Cannot find a previous HDL lab    Cannot find a previous total cholesterol lab        11/5/2024   Contraception/Family Planning   Questions about contraception or family planning No           Reviewed and updated as needed this visit by Provider                  Review of Systems  Constitutional, HEENT, cardiovascular, pulmonary, gi and gu systems are negative, except as otherwise noted.     Objective    Exam  /80   Pulse 102   Temp 97.6  F (36.4  C) (Oral)   Resp 18   Ht 1.88 m (6' 2.02\")   Wt 115.4 kg (254 lb 8 oz)   SpO2 98%   BMI 32.66 kg/m     Estimated body mass index is 32.66 kg/m  as calculated from the following:    Height as of this encounter: 1.88 m (6' 2.02\").    Weight as of this encounter: 115.4 kg (254 lb 8 oz).    Physical Exam  Vitals reviewed.   Constitutional:       Appearance: Normal appearance.   HENT:      Head: Normocephalic and atraumatic.      Right Ear: Tympanic membrane, ear canal and external ear normal.      Left Ear: Tympanic membrane, ear canal and external ear normal.      Nose: Nose normal.      Mouth/Throat:      Mouth: Mucous membranes are moist.   Eyes:      Extraocular Movements: Extraocular movements intact.      Pupils: Pupils are equal, round, and reactive to light.   Cardiovascular:      Rate and Rhythm: Normal rate and regular rhythm.      Heart sounds: Normal heart sounds.   Pulmonary:      Effort: Pulmonary effort is normal.      Breath sounds: Normal breath sounds.   Abdominal:      General: Abdomen is flat. Bowel sounds are normal.      Palpations: Abdomen is soft. "   Musculoskeletal:         General: Tenderness present.      Cervical back: Normal range of motion and neck supple.      Right lower leg: Edema present.      Left lower leg: Edema present.      Comments: +1+2 edema in the lower extremities bilaterally to the mid shin   Skin:     General: Skin is warm and dry.   Neurological:      General: No focal deficit present.      Mental Status: He is alert.   Psychiatric:         Mood and Affect: Mood normal.         Behavior: Behavior normal.         Signed Electronically by: Ru Cho DO

## 2024-11-05 NOTE — RESULT ENCOUNTER NOTE
I have reviewed your diagnostic work and all appears appropriate.     The marker we use to check for heart failure was not elevated.  I still want to get the echocardiogram of your heart.  Kidneys were within normal limits.  Hepatitis C was negative.  HIV is still pending but we will let you know.  You do not show immunization from hepatitis B.  I would recommend redoing the vaccine series.  This can be done at a local pharmacy or through a nurse visit at [facility].  Your good cholesterol (HDL) is low and your bad cholesterol (LDL) slightly elevated.  Your 10 year risk of a cardiovascular event (heart attack or stroke) is 5.8%. I would recommend lifestyle modifications. Also, ensure you are getting 5 servings of fruits and vegetables daily, 3 servings of dairy daily, and 150 minutes of strenous activity per weeks.    Sincerely,    Dr. Cho

## 2024-11-06 ENCOUNTER — MYC MEDICAL ADVICE (OUTPATIENT)
Dept: FAMILY MEDICINE | Facility: CLINIC | Age: 49
End: 2024-11-06
Payer: COMMERCIAL

## 2024-12-12 ENCOUNTER — MYC MEDICAL ADVICE (OUTPATIENT)
Dept: FAMILY MEDICINE | Facility: CLINIC | Age: 49
End: 2024-12-12

## 2024-12-12 ENCOUNTER — TELEPHONE (OUTPATIENT)
Dept: FAMILY MEDICINE | Facility: CLINIC | Age: 49
End: 2024-12-12

## 2024-12-12 ENCOUNTER — HOSPITAL ENCOUNTER (OUTPATIENT)
Dept: CARDIOLOGY | Facility: CLINIC | Age: 49
End: 2024-12-12
Payer: COMMERCIAL

## 2024-12-12 DIAGNOSIS — M79.89 LOCALIZED SWELLING OF LOWER EXTREMITY: ICD-10-CM

## 2024-12-12 DIAGNOSIS — R93.1 ABNORMAL ECHOCARDIOGRAM: Primary | ICD-10-CM

## 2024-12-12 DIAGNOSIS — R06.02 SHORT OF BREATH ON EXERTION: ICD-10-CM

## 2024-12-12 LAB — LVEF ECHO: NORMAL

## 2024-12-12 PROCEDURE — C8929 TTE W OR WO FOL WCON,DOPPLER: HCPCS

## 2024-12-12 PROCEDURE — 255N000002 HC RX 255 OP 636

## 2024-12-12 RX ADMIN — PERFLUTREN 3 ML: 6.52 INJECTION, SUSPENSION INTRAVENOUS at 08:40

## 2024-12-12 NOTE — TELEPHONE ENCOUNTER
Call to patient and relayed provider message. He was upset that he saw the results before the provider. Provided number for patient to call and schedule chest x-ray. Patient stated understanding.     ----- Message from LAKEISHA RADER sent at 12/12/2024  4:51 PM CST -----    Dr. Cho is out this week    I was reviewing your echo and I am slightly concerned for pulmonary hypertension. This can occur when the blood pressure in the lungs is higher than it should be  I will place a referral to cardiology.    I am also going to order a chest xray of your lungs-you can schedule and imaging appointment for this.

## 2024-12-16 ENCOUNTER — TELEPHONE (OUTPATIENT)
Dept: CARDIOLOGY | Facility: CLINIC | Age: 49
End: 2024-12-16
Payer: COMMERCIAL

## 2024-12-16 NOTE — TELEPHONE ENCOUNTER
LVM re: PH referral for Provider visit, Labs, PFT, 6 minute walk and VQ Scan. Gave Elizabeth office number to schedule.    Raudel VENCES

## 2024-12-18 ENCOUNTER — HOSPITAL ENCOUNTER (OUTPATIENT)
Dept: RADIOLOGY | Facility: CLINIC | Age: 49
Discharge: HOME | End: 2024-12-18
Attending: NURSE PRACTITIONER
Payer: COMMERCIAL

## 2024-12-18 ENCOUNTER — TELEPHONE (OUTPATIENT)
Dept: CARDIOLOGY | Facility: CLINIC | Age: 49
End: 2024-12-18
Payer: COMMERCIAL

## 2024-12-18 DIAGNOSIS — R93.1 ABNORMAL ECHOCARDIOGRAM: ICD-10-CM

## 2024-12-18 DIAGNOSIS — E61.1 IRON DEFICIENCY: ICD-10-CM

## 2024-12-18 DIAGNOSIS — R06.02 SHORT OF BREATH ON EXERTION: ICD-10-CM

## 2024-12-18 DIAGNOSIS — R06.02 SHORT OF BREATH ON EXERTION: Primary | ICD-10-CM

## 2024-12-18 DIAGNOSIS — I27.20 PULMONARY HYPERTENSION (H): ICD-10-CM

## 2024-12-18 PROCEDURE — 71046 X-RAY EXAM CHEST 2 VIEWS: CPT

## 2024-12-18 NOTE — TELEPHONE ENCOUNTER
----- Message from Elizabeth LANGE sent at 12/18/2024 12:36 PM CST -----  NEW PATIENT ORDERS are needed under:   Jaquelin Gonzalez MD    FOR:  Labs, 6MWT, PFT, and V/Q Scan    Please send back to me so I can link the orders to the appointment.    Thank you,   Elizabeth

## 2024-12-18 NOTE — TELEPHONE ENCOUNTER
RECORDS RECEIVED FROM:    DATE RECEIVED:    GENERAL RECORDS STATUS DETAILS   OFFICE NOTE from cardiologists N/A    EKG (STRIPS & REPORTS) N/A    ECHOS (IMAGES AND REPORTS) Internal 12-12-24   PULMONARY HYPERTENSION      6 MINUTE WALK TEST N/A    PULMONARY FUNCTION TESTS N/A    RIGHT HEART CATH (IMAGES) N/A    SLEEP STUDY / OVERNIGHT OXIMETRY N/A    XR CHEST   (IMAGES AND REPORTS) Internal 12-18-24   CHEST CT  (IMAGES AND REPORTS) N/A    V/Q SCAN (IMAGES) N/A    LIVER US  (IMAGES AND REPORTS) N/A    ANGIOGRAMS (IMAGES) N/A    STRESS TEST   (IMAGES AND REPORTS) N/A

## 2024-12-20 ENCOUNTER — ANCILLARY PROCEDURE (OUTPATIENT)
Dept: GENERAL RADIOLOGY | Facility: CLINIC | Age: 49
End: 2024-12-20
Payer: COMMERCIAL

## 2024-12-20 DIAGNOSIS — M25.572 PAIN IN JOINT INVOLVING ANKLE AND FOOT, LEFT: ICD-10-CM

## 2024-12-20 DIAGNOSIS — Z83.6 FAMILY HISTORY OF PULMONARY FIBROSIS: ICD-10-CM

## 2024-12-20 DIAGNOSIS — R06.01 ORTHOPNEA: ICD-10-CM

## 2024-12-20 PROCEDURE — 73610 X-RAY EXAM OF ANKLE: CPT | Mod: TC | Performed by: INTERNAL MEDICINE

## 2024-12-23 ENCOUNTER — TELEPHONE (OUTPATIENT)
Dept: FAMILY MEDICINE | Facility: CLINIC | Age: 49
End: 2024-12-23
Payer: COMMERCIAL

## 2024-12-23 DIAGNOSIS — M25.572 PAIN IN JOINT INVOLVING ANKLE AND FOOT, LEFT: Primary | ICD-10-CM

## 2024-12-26 ENCOUNTER — TELEPHONE (OUTPATIENT)
Dept: FAMILY MEDICINE | Facility: CLINIC | Age: 49
End: 2024-12-26
Payer: COMMERCIAL

## 2024-12-26 NOTE — TELEPHONE ENCOUNTER
Patient Returning Call    Reason for call:  pt missed a call from clinic    Information relayed to patient: no, he hasn't checked his voicemail    Patient has additional questions:  Yes    What are your questions/concerns:  just wondering if call was related to his ankle    Could we send this information to you in [facility] or would you prefer to receive a phone call?:   Patient would prefer a phone call   Okay to leave a detailed message?: Yes at Cell number on file:    Telephone Information:   Mobile [phone]

## 2024-12-26 NOTE — TELEPHONE ENCOUNTER
Outgoing call to patient to relay provider message. [facility]    Ru Cho, DO  [facility]3 days ago     NAJMA  There is some changes that are consistent with arthritis.  There is a bone spur on his heel bone.  We can send him to a podiatrist if he would like otherwise he can wear a brace or go to physical therapy.

## 2024-12-26 NOTE — TELEPHONE ENCOUNTER
Writer called and relayed results per provider. Patient verbalized understanding but states that he feels that this was an injury from when he was lunging during table tennis. Patient states he is limping and does not feel this is related to arthritis.     Patient wanting to see specialist.     Routing to PCP to determine if orthopedics or podiatry is more appropriate since patient feels he has some type of injury to this area.    SILAS Foote, RN  [facility]

## 2024-12-30 ENCOUNTER — PATIENT OUTREACH (OUTPATIENT)
Dept: CARE COORDINATION | Facility: CLINIC | Age: 49
End: 2024-12-30
Payer: COMMERCIAL

## 2025-01-13 ENCOUNTER — LAB (OUTPATIENT)
Dept: LAB | Facility: CLINIC | Age: 50
End: 2025-01-13
Payer: COMMERCIAL

## 2025-01-13 ENCOUNTER — OFFICE VISIT (OUTPATIENT)
Dept: PULMONOLOGY | Facility: CLINIC | Age: 50
End: 2025-01-13
Payer: COMMERCIAL

## 2025-01-13 DIAGNOSIS — I27.20 PULMONARY HYPERTENSION (H): Primary | ICD-10-CM

## 2025-01-13 DIAGNOSIS — I27.20 PULMONARY HYPERTENSION (H): ICD-10-CM

## 2025-01-13 DIAGNOSIS — E61.1 IRON DEFICIENCY: ICD-10-CM

## 2025-01-13 DIAGNOSIS — R06.02 SHORT OF BREATH ON EXERTION: ICD-10-CM

## 2025-01-13 DIAGNOSIS — R06.02 SHORT OF BREATH ON EXERTION: Primary | ICD-10-CM

## 2025-01-13 LAB
6 MIN WALK (FT): 1400 FT
6 MIN WALK (M): 427 M
ALBUMIN SERPL BCG-MCNC: 4.3 G/DL (ref 3.5–5.2)
ALP SERPL-CCNC: 58 U/L (ref 40–150)
ALT SERPL W P-5'-P-CCNC: 14 U/L (ref 0–70)
ANION GAP SERPL CALCULATED.3IONS-SCNC: 9 MMOL/L (ref 7–15)
AST SERPL W P-5'-P-CCNC: 16 U/L (ref 0–45)
BILIRUB DIRECT SERPL-MCNC: <0.2 MG/DL (ref 0–0.3)
BILIRUB SERPL-MCNC: 0.2 MG/DL
BUN SERPL-MCNC: 15.7 MG/DL (ref 6–20)
CALCIUM SERPL-MCNC: 9 MG/DL (ref 8.8–10.4)
CHLORIDE SERPL-SCNC: 103 MMOL/L (ref 98–107)
CREAT SERPL-MCNC: 1.27 MG/DL (ref 0.67–1.17)
CRP SERPL-MCNC: <3 MG/L
EGFRCR SERPLBLD CKD-EPI 2021: 70 ML/MIN/1.73M2
ERYTHROCYTE [DISTWIDTH] IN BLOOD BY AUTOMATED COUNT: 12.7 % (ref 10–15)
GLUCOSE SERPL-MCNC: 106 MG/DL (ref 70–99)
HCO3 SERPL-SCNC: 29 MMOL/L (ref 22–29)
HCT VFR BLD AUTO: 41.6 % (ref 40–53)
HGB BLD-MCNC: 14.4 G/DL (ref 13.3–17.7)
INR PPP: 0.95 (ref 0.85–1.15)
IRON BINDING CAPACITY (ROCHE): 333 UG/DL (ref 240–430)
IRON SATN MFR SERPL: 26 % (ref 15–46)
IRON SERPL-MCNC: 85 UG/DL (ref 61–157)
MCH RBC QN AUTO: 31 PG (ref 26.5–33)
MCHC RBC AUTO-ENTMCNC: 34.6 G/DL (ref 31.5–36.5)
MCV RBC AUTO: 90 FL (ref 78–100)
NT-PROBNP SERPL-MCNC: 69 PG/ML (ref 0–450)
PLATELET # BLD AUTO: 184 10E3/UL (ref 150–450)
POTASSIUM SERPL-SCNC: 4 MMOL/L (ref 3.4–5.3)
PROT SERPL-MCNC: 7.3 G/DL (ref 6.4–8.3)
RBC # BLD AUTO: 4.64 10E6/UL (ref 4.4–5.9)
RHEUMATOID FACT SERPL-ACNC: <10 IU/ML
SODIUM SERPL-SCNC: 141 MMOL/L (ref 135–145)
TSH SERPL DL<=0.005 MIU/L-ACNC: 3.7 UIU/ML (ref 0.3–4.2)
WBC # BLD AUTO: 5.1 10E3/UL (ref 4–11)

## 2025-01-13 PROCEDURE — 80053 COMPREHEN METABOLIC PANEL: CPT | Performed by: PATHOLOGY

## 2025-01-13 PROCEDURE — 83880 ASSAY OF NATRIURETIC PEPTIDE: CPT | Performed by: PATHOLOGY

## 2025-01-13 PROCEDURE — 86038 ANTINUCLEAR ANTIBODIES: CPT | Performed by: STUDENT IN AN ORGANIZED HEALTH CARE EDUCATION/TRAINING PROGRAM

## 2025-01-13 PROCEDURE — 36415 COLL VENOUS BLD VENIPUNCTURE: CPT | Performed by: PATHOLOGY

## 2025-01-13 PROCEDURE — 94618 PULMONARY STRESS TESTING: CPT | Performed by: INTERNAL MEDICINE

## 2025-01-13 PROCEDURE — 99000 SPECIMEN HANDLING OFFICE-LAB: CPT | Performed by: PATHOLOGY

## 2025-01-13 PROCEDURE — 85610 PROTHROMBIN TIME: CPT | Performed by: PATHOLOGY

## 2025-01-13 PROCEDURE — 82248 BILIRUBIN DIRECT: CPT | Performed by: PATHOLOGY

## 2025-01-13 PROCEDURE — 84443 ASSAY THYROID STIM HORMONE: CPT | Performed by: PATHOLOGY

## 2025-01-13 PROCEDURE — 85613 RUSSELL VIPER VENOM DILUTED: CPT | Performed by: STUDENT IN AN ORGANIZED HEALTH CARE EDUCATION/TRAINING PROGRAM

## 2025-01-13 PROCEDURE — 83540 ASSAY OF IRON: CPT | Performed by: PATHOLOGY

## 2025-01-13 PROCEDURE — 85730 THROMBOPLASTIN TIME PARTIAL: CPT | Performed by: STUDENT IN AN ORGANIZED HEALTH CARE EDUCATION/TRAINING PROGRAM

## 2025-01-13 PROCEDURE — 86431 RHEUMATOID FACTOR QUANT: CPT | Performed by: STUDENT IN AN ORGANIZED HEALTH CARE EDUCATION/TRAINING PROGRAM

## 2025-01-13 PROCEDURE — 94375 RESPIRATORY FLOW VOLUME LOOP: CPT | Performed by: INTERNAL MEDICINE

## 2025-01-13 PROCEDURE — 94729 DIFFUSING CAPACITY: CPT | Performed by: INTERNAL MEDICINE

## 2025-01-13 PROCEDURE — 86140 C-REACTIVE PROTEIN: CPT | Performed by: PATHOLOGY

## 2025-01-13 PROCEDURE — 94150 VITAL CAPACITY TEST: CPT | Performed by: INTERNAL MEDICINE

## 2025-01-13 PROCEDURE — 85390 FIBRINOLYSINS SCREEN I&R: CPT | Mod: 26 | Performed by: PATHOLOGY

## 2025-01-13 PROCEDURE — 85027 COMPLETE CBC AUTOMATED: CPT | Performed by: PATHOLOGY

## 2025-01-13 PROCEDURE — 84238 ASSAY NONENDOCRINE RECEPTOR: CPT | Mod: 90 | Performed by: PATHOLOGY

## 2025-01-13 PROCEDURE — 83550 IRON BINDING TEST: CPT | Performed by: PATHOLOGY

## 2025-01-13 PROCEDURE — 94726 PLETHYSMOGRAPHY LUNG VOLUMES: CPT | Performed by: INTERNAL MEDICINE

## 2025-01-13 PROCEDURE — 83529 ASAY OF INTERLEUKIN-6 (IL-6): CPT | Performed by: PATHOLOGY

## 2025-01-14 LAB
ANA SER QL IF: NEGATIVE
DLCOCOR-%PRED-PRE: 100 %
DLCOCOR-PRE: 33.52 ML/MIN/MMHG
DLCOUNC-%PRED-PRE: 100 %
DLCOUNC-PRE: 33.33 ML/MIN/MMHG
DLCOUNC-PRED: 33.25 ML/MIN/MMHG
DRVVT SCREEN RATIO: 0.93
ERV-%PRED-PRE: 29 %
ERV-PRE: 0.57 L
ERV-PRED: 1.92 L
EXPTIME-PRE: 6.34 SEC
FEF2575-%PRED-PRE: 78 %
FEF2575-PRE: 3.04 L/SEC
FEF2575-PRED: 3.85 L/SEC
FEFMAX-%PRED-PRE: 88 %
FEFMAX-PRE: 9.56 L/SEC
FEFMAX-PRED: 10.85 L/SEC
FEV1-%PRED-PRE: 98 %
FEV1-PRE: 4.13 L
FEV1FEV6-PRE: 72 %
FEV1FEV6-PRED: 81 %
FEV1FVC-PRE: 72 %
FEV1FVC-PRED: 80 %
FEV1SVC-PRE: 69 %
FEV1SVC-PRED: 80 %
FIFMAX-PRE: 8.29 L/SEC
FRCPLETH-%PRED-PRE: 75 %
FRCPLETH-PRE: 3.05 L
FRCPLETH-PRED: 4.01 L
FVC-%PRED-PRE: 108 %
FVC-PRE: 5.77 L
FVC-PRED: 5.32 L
IC-%PRED-PRE: 141 %
IC-PRE: 5.43 L
IC-PRED: 3.83 L
IL6 SERPL-MCNC: 5.39 PG/ML
LA PPP-IMP: NEGATIVE
LUPUS INTERPRETATION: NORMAL
PTT RATIO: 1
RVPLETH-%PRED-PRE: 108 %
RVPLETH-PRE: 2.48 L
RVPLETH-PRED: 2.29 L
STFR SERPL-MCNC: 3.3 MG/L
THROMBIN TIME: 17.9 SECONDS (ref 13–19)
TLCPLETH-%PRED-PRE: 106 %
TLCPLETH-PRE: 8.47 L
TLCPLETH-PRED: 7.94 L
VA-%PRED-PRE: 106 %
VA-PRE: 8.13 L
VC-%PRED-PRE: 114 %
VC-PRE: 6 L
VC-PRED: 5.25 L

## 2025-01-16 ENCOUNTER — ALLIED HEALTH/NURSE VISIT (OUTPATIENT)
Dept: CARDIOLOGY | Facility: CLINIC | Age: 50
End: 2025-01-16
Payer: COMMERCIAL

## 2025-01-16 ENCOUNTER — PRE VISIT (OUTPATIENT)
Dept: CARDIOLOGY | Facility: CLINIC | Age: 50
End: 2025-01-16
Payer: COMMERCIAL

## 2025-01-16 DIAGNOSIS — Z00.6 RESEARCH SUBJECT: Primary | ICD-10-CM

## 2025-01-16 NOTE — PROGRESS NOTES
Research Title: [place] Pulmonary Hypertension Repository Study (MEASURE)  IRB #: [id number]  PI: Paolo Boone MD ([phone])   Study coordinators: Anne Wellington ([phone]), Lena Nunn ([phone]), Roseanne Wynn ([phone])  Estimated duration of study: Until no longer receiving clinical care at [facility]    Patient was approached for possible participation in the MEASURE registry.  The current IRB approved consent form was reviewed and discussed at length with the patient, including purpose, nature of the research, risk & benefits. Confidentiality issues and the option for data/specimen sharing were also reviewed. Patient was informed that participation is voluntary and that refusal to participate will involve no penalty or decrease benefits to which the patient is entitled. They were informed that they may discontinue their involvement at any time.    Patient had the opportunity to read the entire written consent form, ask any questions and concerns of the study, and was offered sufficient time to consider the research trial.  All questions and concerns were addressed and the patient was able to clearly state what study participation involved. Patient voluntarily signed the combined consent and HIPAA form prior to any research data collection and/or blood sample collection.  A signed copy of the combined consent form was given to the patient.    Patient was consented on January 16, 2025 at 12:30 and opted in the bio-bank sub-study and in the sputum sub-study.

## 2025-01-21 ENCOUNTER — TELEPHONE (OUTPATIENT)
Dept: CARDIOLOGY | Facility: CLINIC | Age: 50
End: 2025-01-21
Payer: COMMERCIAL

## 2025-02-12 ENCOUNTER — TELEPHONE (OUTPATIENT)
Dept: VASCULAR SURGERY | Facility: CLINIC | Age: 50
End: 2025-02-12
Payer: COMMERCIAL

## 2025-02-18 ENCOUNTER — MYC REFILL (OUTPATIENT)
Dept: FAMILY MEDICINE | Facility: CLINIC | Age: 50
End: 2025-02-18

## 2025-02-18 DIAGNOSIS — M79.89 LEG SWELLING: ICD-10-CM

## 2025-02-18 RX ORDER — FUROSEMIDE 40 MG/1
40 TABLET ORAL DAILY
Qty: 90 TABLET | Refills: 1 | Status: SHIPPED | OUTPATIENT
Start: 2025-02-18 | End: 2025-08-17

## 2025-02-18 RX ORDER — ACETAMINOPHEN 325 MG/1
650 TABLET ORAL EVERY 8 HOURS PRN
Qty: 90 TABLET | Refills: 11 | Status: SHIPPED | OUTPATIENT
Start: 2025-02-18 | End: 2026-02-13

## 2025-02-19 ENCOUNTER — LAB (OUTPATIENT)
Dept: LAB | Facility: CLINIC | Age: 50
End: 2025-02-19
Payer: COMMERCIAL

## 2025-02-19 DIAGNOSIS — I27.20 PULMONARY HYPERTENSION (H): ICD-10-CM

## 2025-02-19 DIAGNOSIS — R06.02 SHORT OF BREATH ON EXERTION: ICD-10-CM

## 2025-02-19 LAB
ANION GAP SERPL CALCULATED.3IONS-SCNC: 7 MMOL/L (ref 7–15)
BUN SERPL-MCNC: 11.8 MG/DL (ref 6–20)
CALCIUM SERPL-MCNC: 9.2 MG/DL (ref 8.8–10.4)
CHLORIDE SERPL-SCNC: 104 MMOL/L (ref 98–107)
CREAT SERPL-MCNC: 1.11 MG/DL (ref 0.67–1.17)
EGFRCR SERPLBLD CKD-EPI 2021: 82 ML/MIN/1.73M2
GLUCOSE SERPL-MCNC: 117 MG/DL (ref 70–99)
HCO3 SERPL-SCNC: 28 MMOL/L (ref 22–29)
MAGNESIUM SERPL-MCNC: 2.1 MG/DL (ref 1.7–2.3)
POTASSIUM SERPL-SCNC: 3.9 MMOL/L (ref 3.4–5.3)
SODIUM SERPL-SCNC: 139 MMOL/L (ref 135–145)

## 2025-02-19 PROCEDURE — 80048 BASIC METABOLIC PNL TOTAL CA: CPT

## 2025-02-19 PROCEDURE — 36415 COLL VENOUS BLD VENIPUNCTURE: CPT

## 2025-02-19 PROCEDURE — 83735 ASSAY OF MAGNESIUM: CPT

## 2025-02-26 ENCOUNTER — TELEPHONE (OUTPATIENT)
Dept: FAMILY MEDICINE | Facility: CLINIC | Age: 50
End: 2025-02-26
Payer: COMMERCIAL

## 2025-02-26 NOTE — TELEPHONE ENCOUNTER
Patient Returning Call    Reason for call:  Patient would like a call back regarding what his CT results mean.    Information relayed to patient:  Adv I will send a message    Patient has additional questions:  No    What are your questions/concerns:  N/A    Could we [facility] this information [facility] or would you prefer to receive a phone call?:   Patient would prefer a phone call   Okay to leave a detailed message?: Yes at Cell number on file:    Telephone Information:   Mobile [phone]

## 2025-03-17 NOTE — TELEPHONE ENCOUNTER
Verified orders are in and sent staff msg to Elizabeth for scheduling. Marcy North RN on 3/17/2025 at 9:37 AM

## 2025-03-17 NOTE — TELEPHONE ENCOUNTER
Jaquelin Gonzalez MD  Cardiology Ph Nurse-[facility]2 weeks ago       May I please get your help with calling the patient to let him know that there are no blood clots (pulmonary emboli) or concerning lung disease on his CT scan? Will you please advise him to schedule RHC with vasodilator study and have him follow-up with me after his testing?    Thanks,  Jaquelin       ----------------------------------------------   I would send him a mychart with test results (Generic VM). Marcy North RN on 3/17/2025 at 9:31 AM

## 2025-03-24 ENCOUNTER — PATIENT OUTREACH (OUTPATIENT)
Dept: CARE COORDINATION | Facility: CLINIC | Age: 50
End: 2025-03-24
Payer: COMMERCIAL

## 2025-04-09 ENCOUNTER — MYC REFILL (OUTPATIENT)
Dept: FAMILY MEDICINE | Facility: CLINIC | Age: 50
End: 2025-04-09
Payer: COMMERCIAL

## 2025-04-09 ENCOUNTER — TELEPHONE (OUTPATIENT)
Dept: CARDIOLOGY | Facility: CLINIC | Age: 50
End: 2025-04-09
Payer: COMMERCIAL

## 2025-04-09 DIAGNOSIS — M79.89 LEG SWELLING: ICD-10-CM

## 2025-04-10 NOTE — TELEPHONE ENCOUNTER
Outgoing call to patient to confirm dose, no answer, LMTCB. If patient calls back please route call to RN to confirm medication dosing and then route to PCP.

## 2025-04-14 RX ORDER — FUROSEMIDE 40 MG/1
40 TABLET ORAL DAILY
Qty: 90 TABLET | Refills: 1 | Status: SHIPPED | OUTPATIENT
Start: 2025-04-14 | End: 2025-04-17

## 2025-04-14 NOTE — TELEPHONE ENCOUNTER
Called and LVM/mycamiraht to schedule labs and right heart cath with vaso 1st available . I did schedule a follow up with Dr Gonzalez first available on 5/22 @ 1015 ( can be in person or via video/telephone) please conference call Catherine Fuchs to schedule right heart cath/labs prior [phone], CONFIRM if 5/22 works for patient.     Elizabeth Adamson  Clinic    Pulmonary Hypertension   [facility]  (P) [phone]

## 2025-04-14 NOTE — TELEPHONE ENCOUNTER
Patient returned call. He has been taking 2 of the 40 mg tablets of lasix. He reports he started with 2 of the 20 mg tablets and was not noticing any improvement so he increased to 2 of the 40 mg.     He has seen significant improvement with the 80 mg dose, including minimal to no leg/ankle swelling. Has had increased urination.     He reports he is taking 80 mg dose about 6 days per week. Notes he only takes the lasix when his legs are swollen, will otherwise skip the medication.     Patient reports his legs are starting to get more swollen again since he has not had the medication for a few day. Patient notes he has appt with PCP on 4/17.    Manda Vigil RN

## 2025-04-17 ENCOUNTER — OFFICE VISIT (OUTPATIENT)
Dept: FAMILY MEDICINE | Facility: CLINIC | Age: 50
End: 2025-04-17
Payer: COMMERCIAL

## 2025-04-17 VITALS
HEART RATE: 101 BPM | SYSTOLIC BLOOD PRESSURE: 124 MMHG | RESPIRATION RATE: 18 BRPM | BODY MASS INDEX: 33.74 KG/M2 | WEIGHT: 262.9 LBS | OXYGEN SATURATION: 97 % | DIASTOLIC BLOOD PRESSURE: 86 MMHG

## 2025-04-17 DIAGNOSIS — Z83.6 FAMILY HISTORY OF PULMONARY FIBROSIS: ICD-10-CM

## 2025-04-17 DIAGNOSIS — M77.32 CALCANEAL SPUR OF FOOT, LEFT: ICD-10-CM

## 2025-04-17 DIAGNOSIS — M79.89 LEG SWELLING: ICD-10-CM

## 2025-04-17 DIAGNOSIS — M25.572 PAIN IN JOINT, ANKLE AND FOOT, LEFT: ICD-10-CM

## 2025-04-17 DIAGNOSIS — M79.89 LOCALIZED SWELLING OF LOWER EXTREMITY: Primary | ICD-10-CM

## 2025-04-17 DIAGNOSIS — F19.90 SUBSTANCE USE DISORDER: ICD-10-CM

## 2025-04-17 DIAGNOSIS — Z72.0 NICOTINE USE: Primary | ICD-10-CM

## 2025-04-17 RX ORDER — FUROSEMIDE 40 MG/1
80 TABLET ORAL DAILY
COMMUNITY
Start: 2025-04-17

## 2025-04-17 RX ORDER — BUPROPION HYDROCHLORIDE 150 MG/1
150 TABLET ORAL EVERY MORNING
Qty: 90 TABLET | Refills: 3 | Status: SHIPPED | OUTPATIENT
Start: 2025-04-17 | End: 2026-04-12

## 2025-04-17 RX ORDER — BUPROPION HYDROCHLORIDE 450 MG/1
300 TABLET, FILM COATED, EXTENDED RELEASE ORAL EVERY MORNING
Qty: 90 TABLET | Refills: 4 | Status: SHIPPED | OUTPATIENT
Start: 2025-04-17 | End: 2025-04-17

## 2025-04-17 RX ORDER — BUPROPION HYDROCHLORIDE 300 MG/1
300 TABLET ORAL EVERY MORNING
Qty: 90 TABLET | Refills: 3 | Status: SHIPPED | OUTPATIENT
Start: 2025-04-17 | End: 2026-04-12

## 2025-04-17 NOTE — PROGRESS NOTES
"  Assessment & Plan     Localized swelling of lower extremity  , History of pulmonary fibrosis  Substance use disorder  Leg swelling    Working with cardiology and pulmonology  CT scan as below  They suspect that the elevated pulmonary pressures in right heart failure is secondary to methamphetamine use versus pulmonary fibrosis  Is doing well with the leg swelling with 80 mg of Lasix as well as compression stockings  Will recheck a BMP and magnesium to ensure that he is not losing too many electrolytes with his high dose of diuretics  He has a right heart cath upcoming  In regards to his substance use patient reports that he did go couple days without.  He attributes his reuse to fear of crashing out and needing to work  We could still consider addiction medicine  Patient was on 300 mg of Wellbutrin and this was able to stop his cigarette smoking so we will increase this to 450 and see if this helps with his methamphetamine use as well    - Basic metabolic panel; Future  - Magnesium; Future  - furosemide (LASIX) 40 MG tablet; Take 2 tablets (80 mg) by mouth daily.    Calcaneal spur of foot, left  Pain in joint, ankle and foot, left  Continued pain in the left ankle  He reports in the bottom and that it is different than his plantar fasciitis  It is tender on the palpation near where the calcaneal spur is  It is small in nature however the patient has been denied by a podiatrist  Will place a referral  - Orthopedic  Referral; Future    The longitudinal plan of care for the diagnosis(es)/condition(s) as documented were addressed during this visit. Due to the added complexity in care, I will continue to support Ant in the subsequent management and with ongoing continuity of care.          BMI  Estimated body mass index is 33.74 kg/m  as calculated from the following:    Height as of 12/20/24: 1.88 m (6' 2.02\").    Weight as of this encounter: 119.3 kg (262 lb 14.4 oz).             Narrative & Impression "   CTA pulmonary angiogram     HISTORY: Short of breath on exertion and hypertension     COMPARISON STUDY: None     FINDINGS: There are calcified small nodes in the mediastinum and ehsan.  Dense fibrotic abnormality are noted in both upper lung zones and in  the right lower lobe. No evidence of acute pulmonary embolism. Main  pulmonary artery is 3.8 cm in diameter. Aorta is not enlarged. Mild  distal esophageal wall thickening.     Evaluation of the upper abdomen is limited.     Bones: No suspicious bony lesions.                                                                      IMPRESSION: No evidence of acute or chronic pulmonary embolism. Mild  upper lung predominant dense fibrotic abnormality. Calcified  mediastinal and hilar nodes. Findings may represent sequela of prior  granulomatous infection or inflammatory process.     TIFF GRADY MD        Devonte Cain is a 50 year old, presenting for the following health issues:  Follow Up (Heart conditions and left ankle still bothersome. Questions about CT pt had done in Feb)        4/17/2025    10:18 AM   Additional Questions   Roomed by Marquita TOVAR   Accompanied by SELF     HPI      Patient is still having problems with his left ankle   Was feeling better when wearing the brace every  He took this off and now having pain again  Feels different than his plantar fascitis  Has pain up and around the ventral side    Patient has been working with pulmonology and cardiology for diagnosis of right heart failure versus pulmonary fibrosis  Patient with a CT report that was ordered by them and got concerned that it was read dense fibrosis and that put him into a tailspin  He is now taking 2 of the 40 mg Lasix and this is helping with his swelling  He reports that he has an upcoming right heart cath with his cardiologist to determine the pressures in his right side within his lungs  Patient reports that he is doing well with his substance use and there is proximately 3  days.  Attributes mostly continued use due to fear of crashing when not being on it  He has not had any chest pain          Objective    /86   Pulse 101   Resp 18   Wt 119.3 kg (262 lb 14.4 oz)   SpO2 97%   BMI 33.74 kg/m    Body mass index is 33.74 kg/m .  Physical Exam  Vitals reviewed.   Constitutional:       Appearance: Normal appearance.   HENT:      Head: Normocephalic and atraumatic.      Right Ear: External ear normal.      Left Ear: External ear normal.      Nose: Nose normal.      Mouth/Throat:      Mouth: Mucous membranes are moist.   Eyes:      Extraocular Movements: Extraocular movements intact.      Pupils: Pupils are equal, round, and reactive to light.   Cardiovascular:      Rate and Rhythm: Normal rate and regular rhythm.      Heart sounds: Normal heart sounds.   Pulmonary:      Effort: Pulmonary effort is normal.      Breath sounds: Normal breath sounds.   Abdominal:      General: Abdomen is flat. Bowel sounds are normal.      Palpations: Abdomen is soft.   Musculoskeletal:      Cervical back: Normal range of motion and neck supple.      Comments: Appropriate strength in tested fields   Skin:     General: Skin is warm and dry.   Neurological:      General: No focal deficit present.      Mental Status: He is alert.   Psychiatric:         Mood and Affect: Mood normal.         Behavior: Behavior normal.                Signed Electronically by: Ru Cho DO

## 2025-04-21 ENCOUNTER — PATIENT OUTREACH (OUTPATIENT)
Dept: CARE COORDINATION | Facility: CLINIC | Age: 50
End: 2025-04-21
Payer: COMMERCIAL

## 2025-04-22 ENCOUNTER — TELEPHONE (OUTPATIENT)
Dept: CARDIOLOGY | Facility: CLINIC | Age: 50
End: 2025-04-22
Payer: COMMERCIAL

## 2025-04-23 NOTE — TELEPHONE ENCOUNTER
Called and LVM to schedule labs, right heart cath and see if 5/22 with Dr Gonzalez works either in person or video.    Elizabeth Adamson  Clinic    Pulmonary Hypertension   [facility]  (p) [phone]    as evidenced by increased physiologic demand for healing.

## 2025-04-29 DIAGNOSIS — M79.89 LEG SWELLING: ICD-10-CM

## 2025-04-29 RX ORDER — FUROSEMIDE 40 MG/1
80 TABLET ORAL DAILY
Qty: 90 TABLET | Refills: 1 | Status: CANCELLED | OUTPATIENT
Start: 2025-04-29

## 2025-04-29 NOTE — TELEPHONE ENCOUNTER
Outgoing call to patient. No answer. LMTCB. When pt calls back please route call to clinic RN line to relay message below and help pt schedule lab appointment. Thank you.

## 2025-04-29 NOTE — TELEPHONE ENCOUNTER
"  Medication Question or Refill    Contacts       Contact Date/Time Type Contact Phone/Fax    04/29/2025 09:27 AM CDT Phone (Incoming) DORIS LOGAN (Emergency Contact) [phone] (M)        Patient's spouse relayed they have gone to the [facility] pharmacy 3 times and and Pharmacist said they have not received a prescription yet and to call MD.    Patient has been \"out of the furosemide for almost 2 weeks.\"  What medication are you calling about (include dose and sig)?:     furosemide (LASIX) 40 MG tablet -- -- 4/17/2025 -- No   Sig - Route: Take 2 tablets (80 mg) by mouth daily. - Oral       Preferred Pharmacy:   [facility]/pharmacy #[id number] [address] [address]  Phone: [phone] Fax: [phone]      Controlled Substance Agreement on file:   CSA -- Patient Level:    CSA: None found at the patient level.       Who prescribed the medication?: Ru Cho MD    Do you need a refill? Yes    When did you use the medication last? Couple weeks ago    Patient offered an appointment? No    Do you have any questions or concerns?  Yes:       Could we send this information to you in [facility] or would you prefer to receive a phone call?:   No preference   Okay to leave a detailed message?: Yes at Home number on file [phone] (home)  "

## 2025-04-30 ENCOUNTER — LAB (OUTPATIENT)
Dept: LAB | Facility: CLINIC | Age: 50
End: 2025-04-30
Payer: COMMERCIAL

## 2025-04-30 DIAGNOSIS — M79.89 LEG SWELLING: ICD-10-CM

## 2025-04-30 LAB
ANION GAP SERPL CALCULATED.3IONS-SCNC: 11 MMOL/L (ref 7–15)
BUN SERPL-MCNC: 14.7 MG/DL (ref 6–20)
CALCIUM SERPL-MCNC: 9.3 MG/DL (ref 8.8–10.4)
CHLORIDE SERPL-SCNC: 104 MMOL/L (ref 98–107)
CREAT SERPL-MCNC: 1.2 MG/DL (ref 0.67–1.17)
EGFRCR SERPLBLD CKD-EPI 2021: 75 ML/MIN/1.73M2
GLUCOSE SERPL-MCNC: 118 MG/DL (ref 70–99)
HCO3 SERPL-SCNC: 25 MMOL/L (ref 22–29)
MAGNESIUM SERPL-MCNC: 2 MG/DL (ref 1.7–2.3)
POTASSIUM SERPL-SCNC: 4.1 MMOL/L (ref 3.4–5.3)
SODIUM SERPL-SCNC: 140 MMOL/L (ref 135–145)

## 2025-04-30 PROCEDURE — 36415 COLL VENOUS BLD VENIPUNCTURE: CPT

## 2025-04-30 PROCEDURE — 83735 ASSAY OF MAGNESIUM: CPT

## 2025-04-30 PROCEDURE — 80048 BASIC METABOLIC PNL TOTAL CA: CPT

## 2025-04-30 NOTE — TELEPHONE ENCOUNTER
Incoming call from patient, reviewed note about getting labs done prior. Scheduled lab appointment for later today.     Anaya ARMENTA RN

## 2025-05-01 RX ORDER — FUROSEMIDE 80 MG/1
80 TABLET ORAL DAILY
Qty: 90 TABLET | Refills: 1 | Status: SHIPPED | OUTPATIENT
Start: 2025-05-01 | End: 2025-10-28

## 2025-05-01 NOTE — RESULT ENCOUNTER NOTE
I have reviewed your diagnostic work   Your kidneys are working well.  Things are stable from her last check 2 months ago  Magnesium is not low  Will refill your Lasix  Sincerely,    Dr. Cho

## 2025-05-06 ENCOUNTER — TELEPHONE (OUTPATIENT)
Dept: FAMILY MEDICINE | Facility: CLINIC | Age: 50
End: 2025-05-06
Payer: COMMERCIAL

## 2025-05-06 NOTE — TELEPHONE ENCOUNTER
Outgoing call to patient. No answer. LMTCB. When pt calls back please route call to clinic RN line to relay message below. Thank you.       ----- Message from Ru Cho sent at 5/6/2025  7:24 AM CDT -----  Team - please call patient with results.

## 2025-05-06 NOTE — TELEPHONE ENCOUNTER
Seven leal, letter sent       Elizabeth Adamson  Clinic    Pulmonary Hypertension   [facility]  (P) [phone]

## 2025-05-08 NOTE — TELEPHONE ENCOUNTER
Outreach #3. Call to patient with no answer. LMTCB.     TC: Please relay provider message.     Sending MyChart message and closing encounter.

## 2025-05-19 ENCOUNTER — HOSPITAL ENCOUNTER (EMERGENCY)
Facility: HOSPITAL | Age: 50
Discharge: HOME | End: 2025-05-20
Payer: COMMERCIAL

## 2025-05-19 DIAGNOSIS — J01.90 ACUTE SINUSITIS WITH SYMPTOMS > 10 DAYS: ICD-10-CM

## 2025-05-19 DIAGNOSIS — J20.9 ACUTE BRONCHITIS, UNSPECIFIED ORGANISM: ICD-10-CM

## 2025-05-19 PROCEDURE — 99284 EMERGENCY DEPT VISIT MOD MDM: CPT | Mod: 25

## 2025-05-19 ASSESSMENT — COLUMBIA-SUICIDE SEVERITY RATING SCALE - C-SSRS
1. IN THE PAST MONTH, HAVE YOU WISHED YOU WERE DEAD OR WISHED YOU COULD GO TO SLEEP AND NOT WAKE UP?: NO
2. HAVE YOU ACTUALLY HAD ANY THOUGHTS OF KILLING YOURSELF IN THE PAST MONTH?: NO
6. HAVE YOU EVER DONE ANYTHING, STARTED TO DO ANYTHING, OR PREPARED TO DO ANYTHING TO END YOUR LIFE?: NO

## 2025-05-20 ENCOUNTER — PATIENT OUTREACH (OUTPATIENT)
Dept: FAMILY MEDICINE | Facility: CLINIC | Age: 50
End: 2025-05-20
Payer: COMMERCIAL

## 2025-05-20 ENCOUNTER — APPOINTMENT (OUTPATIENT)
Dept: RADIOLOGY | Facility: HOSPITAL | Age: 50
End: 2025-05-20
Payer: COMMERCIAL

## 2025-05-20 VITALS
SYSTOLIC BLOOD PRESSURE: 125 MMHG | BODY MASS INDEX: 32.73 KG/M2 | OXYGEN SATURATION: 96 % | WEIGHT: 255 LBS | HEART RATE: 92 BPM | HEIGHT: 74 IN | RESPIRATION RATE: 20 BRPM | DIASTOLIC BLOOD PRESSURE: 73 MMHG | TEMPERATURE: 97.7 F

## 2025-05-20 PROCEDURE — 71046 X-RAY EXAM CHEST 2 VIEWS: CPT

## 2025-05-20 RX ORDER — PREDNISONE 20 MG/1
TABLET ORAL
Qty: 10 TABLET | Refills: 0 | Status: SHIPPED | OUTPATIENT
Start: 2025-05-20

## 2025-05-20 RX ORDER — ALBUTEROL SULFATE 90 UG/1
2 INHALANT RESPIRATORY (INHALATION) EVERY 6 HOURS PRN
Qty: 18 G | Refills: 0 | Status: SHIPPED | OUTPATIENT
Start: 2025-05-20

## 2025-05-20 NOTE — DISCHARGE INSTRUCTIONS
Your chest x-ray shows no sign of pneumonia. You were started on antibiotics, a steroid, and an albuterol inhaler to help manage your symptoms. Follow up with your primary care provider if you continue to have symptoms.     If you develop worsening shortness of breath, abdominal pain, chest pain, return to the ED for further evaluation.

## 2025-05-20 NOTE — ED PROVIDER NOTES
Emergency Department Encounter   NAME: Jake Rodriguez ; AGE: 50 year old male ; YOB: 1975 ; MRN: [id number] ; PCP: Ru Cho   ED PROVIDER: Anaya Ross PA-C    Evaluation Date & Time:   No admission date for patient encounter.    CHIEF COMPLAINT:  Cough and Nasal Congestion      Impression and Plan   MDM: Jake Rodriguez is a 50 year old male who presents to the ED for evaluation of cough and nasal congestion x 2 weeks.  Patient states he has had sinus pressure, cough, nasal congestion for the last 2 weeks that is getting worse history of bronchitis and sinus infections, for which he feels this is the same.  He has not tried anything over-the-counter for his symptoms.  Does endorse mild shortness of breath.  Denies abdominal pain, nausea or vomiting, fever chills, recent sick contacts.    Patient is initially mild hypertensive and tachycardic at 110, otherwise vitally stable, no acute distress. Physical exam is significant for sinus tenderness to palpation bilaterally mucous membranes are moist.  There is some notable wheezing in the right upper lobe on lung exam, heart sounds normal.  Abdomen is benign. Differential diagnosis includes pneumonia, pneumothorax, pleural effusion, COVID, flu, sinus infection, seasonal allergies.    With audible wheezing on exam, will get chest x-ray for further evaluation.  Independently reviewed and interpreted chest x-ray and see no sign of consolidation suggestive of pneumonia, pleural effusions, pneumothorax.  Low suspicion for COVID/flu, although I did offer this, but patient declines.  With length of patient's symptoms and tenderness to palpation of the patient's sinuses, did feel he may have a sinus infection at this time.  Will start patient on antibiotics, prednisone currently for bronchitis, no butyryl inhaler.  Recommend he follow-up with primary care provider which he is overall on board with.  Stable for discharge.    Return precautions to the  "ED were discussed, patient verbalized understanding and were agreeable with the plan. All questions were answered.     Per chart review:  -16 2025, seen in cardiology clinic for shortness of breath on exertion pulmonary hypertension  - Was seen 10/18/2025 for atypical pneumonia bronchitis  - Recent labs and imaging reviewed  - Care everywhere reviewed    Medical Decision Making      Discharge. I prescribed additional prescription strength medication(s) as charted. I considered admission, but discharged patient after significant clinical improvement.    MIPS (CTPE, Dental pain, Farfan, Sinusitis, Asthma/COPD, Head Trauma): Not Applicable    SEPSIS: None        ED COURSE:  11:02 PM I met and introduced myself to the patient. I gathered initial history and performed my physical exam. We discussed plan for initial workup.   12:43 AM I rechecked the patient and discussed results, discharge, follow up, and reasons to return to the ED.       FINAL IMPRESSION:    ICD-10-CM    1. Acute sinusitis with symptoms > 10 days  J01.90       2. Acute bronchitis, unspecified organism  J20.9             MEDICATIONS GIVEN IN THE EMERGENCY DEPARTMENT:  Medications - No data to display      NEW PRESCRIPTIONS STARTED AT TODAY'S ED VISIT:  New Prescriptions    ALBUTEROL (PROAIR HFA/PROVENTIL HFA/VENTOLIN HFA) 108 (90 BASE) MCG/ACT INHALER    Inhale 2 puffs into the lungs every 6 hours as needed for shortness of breath, wheezing or cough.    AMOXICILLIN-CLAVULANATE (AUGMENTIN) 875-125 MG TABLET    Take 1 tablet by mouth 2 times daily for 7 days.    PREDNISONE (DELTASONE) 20 MG TABLET    Take two tablets (= 40mg) each day for 5 (five) days         HPI   Use of Intrepreter: N/A     Andriyflorina Rodriguez is a 50 year old male with a pertinent history of pulmonary hypertension who presents to the ED by walk-in for evaluation of a cough and congestion.    Patient endorses 2 weeks of nasal congestion, \"heavy\" mucus production, shortness of breath " "that is worse than his baseline shortness of breath, and \"uncontrollable\" coughing attacks.  He also has weakness, and exhaustion attributable to going to work as normal. He has occasional chills that may be attributable to the weather. Patient visited Urgent Care today for his symptoms, waited for 2 hours, and was not seen. He returned home to care for his family, then left for the ED.     Reports history of chronic bronchitis every year since he was 26 years old. He is normally treated with antibiotics. He does not take medications regularly for the bronchitis. Patient has allergies, but does not take allergy medications. No history of asthma. Patient takes medications including Wellbutrin and furosemide. He quit smoking 3-4 months ago after smoking ofr 30 years.      REVIEW OF SYSTEMS:  Pertinent positive and negative symptoms per HPI.       Medical History     Past Medical History:   Diagnosis Date    Pulmonary hypertension (H)        Past Surgical History:   Procedure Laterality Date    ADENOIDECTOMY      HERNIA REPAIR      age 3-4    SURGICAL PATHOLOGY EXAM         Family History   Problem Relation Age of Onset    Hypertension Mother     Atrial fibrillation Mother     Pulmonary fibrosis Father         dx late 40s, passed age 56    Heart Disease Sister     Hypertension Sister     Colon Cancer No family hx of        Social History     Tobacco Use    Smoking status: Former     Current packs/day: 0.00     Average packs/day: 0.5 packs/day for 33.0 years (16.5 ttl pk-yrs)     Types: Cigarettes     Start date:      Quit date: 2025     Years since quittin.3     Passive exposure: Past    Smokeless tobacco: Never   Vaping Use    Vaping status: Never Used   Substance Use Topics    Alcohol use: Yes     Alcohol/week: 5.0 standard drinks of alcohol     Types: 5 Standard drinks or equivalent per week    Drug use: Yes     Types: Marijuana, Amphetamines       albuterol (PROAIR HFA/PROVENTIL HFA/VENTOLIN HFA) " "108 (90 Base) MCG/ACT inhaler  amoxicillin-clavulanate (AUGMENTIN) 875-125 MG tablet  predniSONE (DELTASONE) 20 MG tablet  buPROPion (WELLBUTRIN XL) 150 MG 24 hr tablet  buPROPion (WELLBUTRIN XL) 300 MG 24 hr tablet  furosemide (LASIX) 40 MG tablet  furosemide (LASIX) 80 MG tablet          Physical Exam     First Vitals:  Patient Vitals for the past 24 hrs:   BP Temp Temp src Pulse Resp SpO2 Height Weight   05/20/25 0029 -- -- -- 92 -- 96 % -- --   05/20/25 0022 125/73 -- -- 94 -- 97 % -- --   05/19/25 2234 (!) 162/94 97.7  F (36.5  C) Oral 110 20 97 % 1.88 m (6' 2\") 115.7 kg (255 lb)         PHYSICAL EXAM:   Physical Exam  Constitutional:       General: He is not in acute distress.     Appearance: Normal appearance. He is not toxic-appearing.   HENT:      Head: Atraumatic.      Right Ear: External ear normal.      Left Ear: External ear normal.      Nose: Nose normal.      Mouth/Throat:      Mouth: Mucous membranes are moist.   Eyes:      General: No scleral icterus.     Conjunctiva/sclera: Conjunctivae normal.   Cardiovascular:      Rate and Rhythm: Normal rate and regular rhythm.      Heart sounds: Normal heart sounds.   Pulmonary:      Effort: Pulmonary effort is normal. No tachypnea, accessory muscle usage, prolonged expiration or respiratory distress.      Breath sounds: Examination of the right-upper field reveals wheezing. Examination of the right-middle field reveals wheezing. Wheezing present.   Abdominal:      General: There is no distension.      Palpations: Abdomen is soft.      Tenderness: There is no abdominal tenderness. There is no guarding.   Musculoskeletal:         General: No deformity.      Cervical back: Neck supple.   Skin:     General: Skin is warm.      Capillary Refill: Capillary refill takes less than 2 seconds.   Neurological:      General: No focal deficit present.      Mental Status: He is alert and oriented to person, place, and time.             Results     LAB:  All pertinent labs " reviewed and interpreted  Labs Ordered and Resulted from Time of ED Arrival to Time of ED Departure - No data to display    RADIOLOGY:  XR Chest 2 Views   Final Result   IMPRESSION: Chronic scarring in the right upper lobe. Lungs otherwise clear. Normal heart size and pulmonary vascularity. No pleural effusion or pneumothorax.            ECG:  None    PROCEDURES:  None      IChanelle, am serving as a scribe to document services personally performed by Anaya Ross PA-C, based on my observation and the provider's statements to me. IAnaya PA-C attest that Chanelle Gtz is acting in a scribe capacity, has observed my performance of the services and has documented them in accordance with my direction.       Anaya Ross PA-C   Emergency Medicine   [facility] EMERGENCY DEPARTMENT       Anaya Ross PA-C  05/20/25 0043

## 2025-05-20 NOTE — TELEPHONE ENCOUNTER
Notes for RN in case of return call from patient or second attempt call:  Discharged on 5/19/25 from [facility] Emergency Department.  ER or Hospital? ER  Diagnosis for visit: Acute sinusitis with symptoms   Other information if necessary: Will start patient on antibiotics, prednisone currently for bronchitis, no butyryl inhaler. Recommend he follow-up with primary care provider which he is overall on board with. Stable for discharge.     Delete this note after TCM call documentation is completed.      Chano LYON RN

## 2025-05-20 NOTE — ED TRIAGE NOTES
Patient has had a cough for two weeks. Patient also reports nasal congestion. Patient reports that he frequently gets bronchitis and sinusitis and his symptoms feel similar. Patient reports that his cough has been lingering, increases at night while lying down.      Triage Assessment (Adult)       Row Name 05/19/25 5306          Triage Assessment    Airway WDL WDL        Respiratory WDL    Respiratory WDL cough     Cough Frequency frequent     Cough Type dry        Skin Circulation/Temperature WDL    Skin Circulation/Temperature WDL WDL        Peripheral/Neurovascular WDL    Peripheral Neurovascular WDL WDL        Cognitive/Neuro/Behavioral WDL    Cognitive/Neuro/Behavioral WDL WDL

## 2025-06-05 ENCOUNTER — APPOINTMENT (OUTPATIENT)
Dept: ULTRASOUND IMAGING | Facility: HOSPITAL | Age: 50
End: 2025-06-05
Attending: INTERNAL MEDICINE
Payer: COMMERCIAL

## 2025-06-05 ENCOUNTER — HOSPITAL ENCOUNTER (OUTPATIENT)
Facility: CLINIC | Age: 50
End: 2025-06-05
Attending: INTERNAL MEDICINE | Admitting: INTERNAL MEDICINE
Payer: COMMERCIAL

## 2025-06-05 ENCOUNTER — APPOINTMENT (OUTPATIENT)
Dept: CARDIOLOGY | Facility: HOSPITAL | Age: 50
End: 2025-06-05
Attending: INTERNAL MEDICINE
Payer: COMMERCIAL

## 2025-06-05 ENCOUNTER — HOSPITAL ENCOUNTER (INPATIENT)
Facility: HOSPITAL | Age: 50
LOS: 1 days | Discharge: HOME | End: 2025-06-05
Attending: EMERGENCY MEDICINE | Admitting: INTERNAL MEDICINE
Payer: COMMERCIAL

## 2025-06-05 ENCOUNTER — APPOINTMENT (OUTPATIENT)
Dept: CT IMAGING | Facility: HOSPITAL | Age: 50
End: 2025-06-05
Attending: EMERGENCY MEDICINE
Payer: COMMERCIAL

## 2025-06-05 VITALS
TEMPERATURE: 99 F | RESPIRATION RATE: 20 BRPM | WEIGHT: 254 LBS | BODY MASS INDEX: 32.61 KG/M2 | SYSTOLIC BLOOD PRESSURE: 116 MMHG | HEART RATE: 99 BPM | DIASTOLIC BLOOD PRESSURE: 79 MMHG | OXYGEN SATURATION: 93 %

## 2025-06-05 DIAGNOSIS — I27.20 PULMONARY HTN (H): Primary | ICD-10-CM

## 2025-06-05 DIAGNOSIS — I27.20 PULMONARY HTN (H): ICD-10-CM

## 2025-06-05 DIAGNOSIS — R05.1 ACUTE COUGH: ICD-10-CM

## 2025-06-05 DIAGNOSIS — I26.09 ACUTE PULMONARY EMBOLISM WITH ACUTE COR PULMONALE, UNSPECIFIED PULMONARY EMBOLISM TYPE (H): ICD-10-CM

## 2025-06-05 DIAGNOSIS — R04.2 HEMOPTYSIS: ICD-10-CM

## 2025-06-05 LAB
ANION GAP SERPL CALCULATED.3IONS-SCNC: 6 MMOL/L (ref 7–15)
APTT PPP: 26 SECONDS (ref 22–38)
BASOPHILS # BLD AUTO: 0 10E3/UL (ref 0–0.2)
BASOPHILS NFR BLD AUTO: 0 %
BUN SERPL-MCNC: 12.5 MG/DL (ref 6–20)
CALCIUM SERPL-MCNC: 8.8 MG/DL (ref 8.8–10.4)
CHLORIDE SERPL-SCNC: 99 MMOL/L (ref 98–107)
CREAT SERPL-MCNC: 1.04 MG/DL (ref 0.67–1.17)
EGFRCR SERPLBLD CKD-EPI 2021: 89 ML/MIN/1.73M2
EOSINOPHIL # BLD AUTO: 0.1 10E3/UL (ref 0–0.7)
EOSINOPHIL NFR BLD AUTO: 1 %
ERYTHROCYTE [DISTWIDTH] IN BLOOD BY AUTOMATED COUNT: 12.4 % (ref 10–15)
GLUCOSE SERPL-MCNC: 104 MG/DL (ref 70–99)
HCO3 SERPL-SCNC: 29 MMOL/L (ref 22–29)
HCT VFR BLD AUTO: 37.4 % (ref 40–53)
HGB BLD-MCNC: 12.8 G/DL (ref 13.3–17.7)
HOLD SPECIMEN: NORMAL
IMM GRANULOCYTES # BLD: 0 10E3/UL
IMM GRANULOCYTES NFR BLD: 0 %
INR PPP: 1 (ref 0.85–1.15)
LVEF ECHO: NORMAL
LYMPHOCYTES # BLD AUTO: 1.1 10E3/UL (ref 0.8–5.3)
LYMPHOCYTES NFR BLD AUTO: 15 %
MCH RBC QN AUTO: 30.8 PG (ref 26.5–33)
MCHC RBC AUTO-ENTMCNC: 34.2 G/DL (ref 31.5–36.5)
MCV RBC AUTO: 90 FL (ref 78–100)
MONOCYTES # BLD AUTO: 0.9 10E3/UL (ref 0–1.3)
MONOCYTES NFR BLD AUTO: 12 %
NEUTROPHILS # BLD AUTO: 5 10E3/UL (ref 1.6–8.3)
NEUTROPHILS NFR BLD AUTO: 70 %
NRBC # BLD AUTO: 0 10E3/UL
NRBC BLD AUTO-RTO: 0 /100
NT-PROBNP SERPL-MCNC: 156 PG/ML (ref 0–138)
PLATELET # BLD AUTO: 190 10E3/UL (ref 150–450)
POTASSIUM SERPL-SCNC: 4 MMOL/L (ref 3.4–5.3)
PROCALCITONIN SERPL IA-MCNC: 0.08 NG/ML
PROTHROMBIN TIME: 13.5 SECONDS (ref 11.8–14.8)
RADIOLOGIST FLAGS: ABNORMAL
RBC # BLD AUTO: 4.16 10E6/UL (ref 4.4–5.9)
SODIUM SERPL-SCNC: 134 MMOL/L (ref 135–145)
TROPONIN T SERPL HS-MCNC: 9 NG/L
TROPONIN T SERPL HS-MCNC: 9 NG/L
UFH PPP CHRO-ACNC: 0.38 IU/ML (ref ?–1.1)
WBC # BLD AUTO: 7.1 10E3/UL (ref 4–11)

## 2025-06-05 PROCEDURE — 94640 AIRWAY INHALATION TREATMENT: CPT

## 2025-06-05 PROCEDURE — 93970 EXTREMITY STUDY: CPT

## 2025-06-05 PROCEDURE — 83880 ASSAY OF NATRIURETIC PEPTIDE: CPT | Performed by: EMERGENCY MEDICINE

## 2025-06-05 PROCEDURE — 84145 PROCALCITONIN (PCT): CPT | Performed by: EMERGENCY MEDICINE

## 2025-06-05 PROCEDURE — 250N000013 HC RX MED GY IP 250 OP 250 PS 637: Performed by: EMERGENCY MEDICINE

## 2025-06-05 PROCEDURE — 84295 ASSAY OF SERUM SODIUM: CPT | Performed by: EMERGENCY MEDICINE

## 2025-06-05 PROCEDURE — 255N000002 HC RX 255 OP 636: Performed by: INTERNAL MEDICINE

## 2025-06-05 PROCEDURE — 999N000157 HC STATISTIC RCP TIME EA 10 MIN

## 2025-06-05 PROCEDURE — 84520 ASSAY OF UREA NITROGEN: CPT | Performed by: EMERGENCY MEDICINE

## 2025-06-05 PROCEDURE — 99418 PROLNG IP/OBS E/M EA 15 MIN: CPT | Performed by: INTERNAL MEDICINE

## 2025-06-05 PROCEDURE — 85520 HEPARIN ASSAY: CPT | Performed by: EMERGENCY MEDICINE

## 2025-06-05 PROCEDURE — 93306 TTE W/DOPPLER COMPLETE: CPT | Mod: 26 | Performed by: STUDENT IN AN ORGANIZED HEALTH CARE EDUCATION/TRAINING PROGRAM

## 2025-06-05 PROCEDURE — 250N000009 HC RX 250: Performed by: EMERGENCY MEDICINE

## 2025-06-05 PROCEDURE — 71275 CT ANGIOGRAPHY CHEST: CPT

## 2025-06-05 PROCEDURE — 85610 PROTHROMBIN TIME: CPT | Performed by: EMERGENCY MEDICINE

## 2025-06-05 PROCEDURE — 99236 HOSP IP/OBS SAME DATE HI 85: CPT | Performed by: INTERNAL MEDICINE

## 2025-06-05 PROCEDURE — 93005 ELECTROCARDIOGRAM TRACING: CPT | Performed by: EMERGENCY MEDICINE

## 2025-06-05 PROCEDURE — C8929 TTE W OR WO FOL WCON,DOPPLER: HCPCS

## 2025-06-05 PROCEDURE — 36415 COLL VENOUS BLD VENIPUNCTURE: CPT | Performed by: EMERGENCY MEDICINE

## 2025-06-05 PROCEDURE — 250N000013 HC RX MED GY IP 250 OP 250 PS 637: Performed by: INTERNAL MEDICINE

## 2025-06-05 PROCEDURE — 250N000011 HC RX IP 250 OP 636: Performed by: EMERGENCY MEDICINE

## 2025-06-05 PROCEDURE — 85025 COMPLETE CBC W/AUTO DIFF WBC: CPT | Performed by: EMERGENCY MEDICINE

## 2025-06-05 PROCEDURE — 120N000001 HC R&B MED SURG/OB

## 2025-06-05 PROCEDURE — 99291 CRITICAL CARE FIRST HOUR: CPT | Mod: 25

## 2025-06-05 PROCEDURE — 84484 ASSAY OF TROPONIN QUANT: CPT | Performed by: EMERGENCY MEDICINE

## 2025-06-05 PROCEDURE — 85730 THROMBOPLASTIN TIME PARTIAL: CPT | Performed by: EMERGENCY MEDICINE

## 2025-06-05 RX ORDER — HEPARIN SODIUM 10000 [USP'U]/100ML
0-5000 INJECTION, SOLUTION INTRAVENOUS CONTINUOUS
Status: DISPENSED | OUTPATIENT
Start: 2025-06-05 | End: 2025-06-05

## 2025-06-05 RX ORDER — IPRATROPIUM BROMIDE AND ALBUTEROL SULFATE 2.5; .5 MG/3ML; MG/3ML
3 SOLUTION RESPIRATORY (INHALATION) ONCE
Status: COMPLETED | OUTPATIENT
Start: 2025-06-05 | End: 2025-06-05

## 2025-06-05 RX ORDER — BUPROPION HYDROCHLORIDE 150 MG/1
300 TABLET ORAL EVERY MORNING
Status: DISCONTINUED | OUTPATIENT
Start: 2025-06-05 | End: 2025-06-05 | Stop reason: HOSPADM

## 2025-06-05 RX ORDER — BENZONATATE 100 MG/1
100 CAPSULE ORAL ONCE
Status: COMPLETED | OUTPATIENT
Start: 2025-06-05 | End: 2025-06-05

## 2025-06-05 RX ORDER — ONDANSETRON 4 MG/1
4 TABLET, ORALLY DISINTEGRATING ORAL EVERY 6 HOURS PRN
Status: DISCONTINUED | OUTPATIENT
Start: 2025-06-05 | End: 2025-06-05 | Stop reason: HOSPADM

## 2025-06-05 RX ORDER — CALCIUM CARBONATE 500 MG/1
1000 TABLET, CHEWABLE ORAL 4 TIMES DAILY PRN
Status: DISCONTINUED | OUTPATIENT
Start: 2025-06-05 | End: 2025-06-05 | Stop reason: HOSPADM

## 2025-06-05 RX ORDER — ACETAMINOPHEN 325 MG/1
650 TABLET ORAL EVERY 4 HOURS PRN
Status: DISCONTINUED | OUTPATIENT
Start: 2025-06-05 | End: 2025-06-05 | Stop reason: HOSPADM

## 2025-06-05 RX ORDER — SALIVA STIMULANT COMB. NO.3
1 SPRAY, NON-AEROSOL (ML) MUCOUS MEMBRANE 4 TIMES DAILY
Status: DISCONTINUED | OUTPATIENT
Start: 2025-06-05 | End: 2025-06-05 | Stop reason: HOSPADM

## 2025-06-05 RX ORDER — AMOXICILLIN 250 MG
1 CAPSULE ORAL 2 TIMES DAILY PRN
Status: DISCONTINUED | OUTPATIENT
Start: 2025-06-05 | End: 2025-06-05 | Stop reason: HOSPADM

## 2025-06-05 RX ORDER — AMOXICILLIN 250 MG
2 CAPSULE ORAL 2 TIMES DAILY PRN
Status: DISCONTINUED | OUTPATIENT
Start: 2025-06-05 | End: 2025-06-05 | Stop reason: HOSPADM

## 2025-06-05 RX ORDER — IOPAMIDOL 755 MG/ML
90 INJECTION, SOLUTION INTRAVASCULAR ONCE
Status: COMPLETED | OUTPATIENT
Start: 2025-06-05 | End: 2025-06-05

## 2025-06-05 RX ORDER — LIDOCAINE 40 MG/G
CREAM TOPICAL
OUTPATIENT
Start: 2025-06-05

## 2025-06-05 RX ORDER — BUPROPION HYDROCHLORIDE 150 MG/1
150 TABLET ORAL EVERY MORNING
Status: DISCONTINUED | OUTPATIENT
Start: 2025-06-05 | End: 2025-06-05

## 2025-06-05 RX ORDER — FUROSEMIDE 20 MG/1
80 TABLET ORAL DAILY
Status: DISCONTINUED | OUTPATIENT
Start: 2025-06-05 | End: 2025-06-05 | Stop reason: HOSPADM

## 2025-06-05 RX ORDER — ACETAMINOPHEN 650 MG/1
650 SUPPOSITORY RECTAL EVERY 4 HOURS PRN
Status: DISCONTINUED | OUTPATIENT
Start: 2025-06-05 | End: 2025-06-05 | Stop reason: HOSPADM

## 2025-06-05 RX ORDER — ONDANSETRON 2 MG/ML
4 INJECTION INTRAMUSCULAR; INTRAVENOUS EVERY 6 HOURS PRN
Status: DISCONTINUED | OUTPATIENT
Start: 2025-06-05 | End: 2025-06-05 | Stop reason: HOSPADM

## 2025-06-05 RX ORDER — LIDOCAINE 40 MG/G
CREAM TOPICAL
Status: DISCONTINUED | OUTPATIENT
Start: 2025-06-05 | End: 2025-06-05 | Stop reason: HOSPADM

## 2025-06-05 RX ORDER — OXYCODONE HYDROCHLORIDE 5 MG/1
5 TABLET ORAL EVERY 4 HOURS PRN
Refills: 0 | Status: DISCONTINUED | OUTPATIENT
Start: 2025-06-05 | End: 2025-06-05 | Stop reason: HOSPADM

## 2025-06-05 RX ORDER — ALBUTEROL SULFATE 90 UG/1
2 INHALANT RESPIRATORY (INHALATION) EVERY 6 HOURS PRN
Status: DISCONTINUED | OUTPATIENT
Start: 2025-06-05 | End: 2025-06-05 | Stop reason: HOSPADM

## 2025-06-05 RX ADMIN — FUROSEMIDE 80 MG: 20 TABLET ORAL at 10:49

## 2025-06-05 RX ADMIN — IPRATROPIUM BROMIDE AND ALBUTEROL SULFATE 3 ML: .5; 3 SOLUTION RESPIRATORY (INHALATION) at 07:42

## 2025-06-05 RX ADMIN — PERFLUTREN 2 ML: 6.52 INJECTION, SUSPENSION INTRAVENOUS at 10:10

## 2025-06-05 RX ADMIN — IOPAMIDOL 90 ML: 755 INJECTION, SOLUTION INTRAVENOUS at 07:40

## 2025-06-05 RX ADMIN — BUPROPION HYDROCHLORIDE 300 MG: 300 TABLET, EXTENDED RELEASE ORAL at 10:50

## 2025-06-05 RX ADMIN — BENZONATATE 100 MG: 100 CAPSULE ORAL at 07:59

## 2025-06-05 RX ADMIN — RIVAROXABAN 15 MG: 15 TABLET, FILM COATED ORAL at 17:25

## 2025-06-05 RX ADMIN — HEPARIN SODIUM 1800 UNITS/HR: 10000 INJECTION, SOLUTION INTRAVENOUS at 08:43

## 2025-06-05 ASSESSMENT — ACTIVITIES OF DAILY LIVING (ADL)
ADLS_ACUITY_SCORE: 41

## 2025-06-05 ASSESSMENT — ENCOUNTER SYMPTOMS
VOMITING: 0
COUGH: 1
FEVER: 0
SHORTNESS OF BREATH: 1
ABDOMINAL PAIN: 0
DYSURIA: 0
NAUSEA: 0
DIARRHEA: 0

## 2025-06-05 NOTE — PROGRESS NOTES
Heparin turned off at 1700, first dose of xarelto given. Rx given to patient from pharmacy. All discharge paperwork reviewed, all questions answered. PIV's removed and patient discharged home at 1745.     Noelle Sesay RN

## 2025-06-05 NOTE — ED PROVIDER NOTES
EMERGENCY DEPARTMENT ENCOUNTER      NAME: Jake Rodriguez  AGE: 50 year old male  YOB: 1975  MRN: [id number]  EVALUATION DATE & TIME: 6/5/2025  6:17 AM    PCP: Ru Cho    ED PROVIDER: Belinda Gunter M.D.        Chief Complaint   Patient presents with    Hemoptysis       FINAL IMPRESSION:    1. Acute cough    2. Acute pulmonary embolism with acute cor pulmonale, unspecified pulmonary embolism type (H)    3. Hemoptysis      MEDICAL DECISION MAKING:    Jake Rodriguez is a 50 year old male with history of pulm hypertension, quit smoking after 30 years of tobacco abuse, yearly bronchitis who presents to the ER with complaints of cough.    Patient has had a cough now for several weeks.  He was seen in the emergency department back on May 19, 2025 for cough and diagnosed with possible bronchitis.  Sent home with prescription for albuterol, prednisone, and Augmentin.  He states initially he felt a little bit better but now the cough has returned.  He reports a small amount of hemoptysis.    CT imaging consistent with PE with some central and subsegmental burden.  Also has area of possible infarct.  Some parts of it may be more chronic and other parts look more subacute.  Spoke with body IR who is recommending IV heparin drip and no intervention at this time.  [facility]  Patient aware of this plan and agrees.  Hemodynamically stable without increased work of breathing, hypoxia, etc.      ED COURSE:  6:20 AM  I met with the patient to gather history and perform my exam. ED course and treatment discussed. Lungs clear but pt feels that he is wheezing. Will try a neb and Tessalon Perles.     8:09 AM  Received a call from radiologist.  Patient does have multiple PEs including central and subsegmental.  There is some right heart strain and pulmonary infarct.  Recommends discussion with IR.    8:20  AM  Spoke with Body IR who recommends heparin gtt and no intervention at this time.    8:31 AM  Updated pt on his results and plan to do iv heparin gtt I anticipate that he will be in the hospital a day or 2 as they transition him to oral anticoagulants.  He looks great overall.  No increased work of breathing, no hypoxia, etc.  He has had symptoms now for about 4 weeks and therefore some of this may be more chronic with some subacute findings.    8:35 AM  Patient has been accepted to the hospital by the hospitalist will go to medical telemetry under inpatient status.  He is hemodynamically stable.  He is not requiring any oxygen and is not hypoxic.  No increased work of breathing.    I considered rib fractures, allergic reaction, COPD exacerbation, asthma exacerbation, pneumonia, CHF, myocarditis, pericarditis, endocarditis, ACS, PE, ruptured AAA, pneumothorax, aortic dissection, bowel obstruction, and other such etiologies at this time.    At the conclusion of the encounter I discussed the results of all of the tests and the disposition. Their questions were answered. The patient (and any family present) acknowledged understanding and were agreeable with the care plan.      CRITICAL CARE TIME  Total critical care time: 45 minutes  Critical care time was exclusive of separately billable procedures and treating other patients.  Critical care was necessary to treat or prevent imminent or life-threatening deterioration of the following conditions: PE requiring IV heparin gtt  Critical care was time spent personally by me on the following activities: development of treatment plan with patient or surrogate, discussions with consultants, examination of patient, evaluation of patient's response to treatment, obtaining history from patient or surrogate, ordering and performing treatments and interventions, ordering and review of laboratory studies, ordering and review of radiographic studies and re-evaluation of patient's  condition, this excludes any separately billable procedures.      CONSULTANTS:  Hospitalist - Dr. Morales  Radiologist - Dr. Fahrner  Body IR - Dr. Hughes         MEDICATIONS GIVEN IN THE EMERGENCY:  Medications   heparin ANTICOAGULANT loading dose for  HIGH INTENSITY TREATMENT* Give BEFORE starting heparin infusion (has no administration in time range)   heparin 25,000 units in 0.45% NaCl 250 mL ANTICOAGULANT infusion (has no administration in time range)   ipratropium - albuterol 0.5 mg/2.5 mg/3 mL (DUONEB) neb solution 3 mL (3 mLs Nebulization $Given 6/5/25 0815)   benzonatate (TESSALON) capsule 100 mg (100 mg Oral $Given 6/5/25 1282)   iopamidol (ISOVUE-370) solution 90 mL (90 mLs Intravenous $Given 6/5/25 3945)       NEW PRESCRIPTIONS STARTED AT TODAY'S ER VISIT  none    CONDITION:  stable        DISPOSITION:  Med tele ip as accepted by Dr. Morales, hospitalist         =================================================================  =================================================================  TRIAGE ASSESSMENT:  Pt reports recent bronchitis 2 weeks ago. He was discharged on antibiotics. His symptoms improved but did not go away. The symptoms have not gotten worse again. He also reports pain in the right side of the chest. He thinks that this is due to an injury he sustained while wrestling 5 days ago. He reports the pain is worse when he coughs. He has been coughing up blood for 24 hours.       ED Triage Vitals [06/05/25 0613]   Encounter Vitals Group      /82      Systolic BP Percentile       Diastolic BP Percentile       Pulse 103      Resp 16      Temp 98  F (36.7  C)      Temp src Oral      SpO2 98 %      Weight 115.2 kg (254 lb)       ================================================================  ================================================================    [facility]    Patient information was obtained from: patient     Use of Intrepreter: N/A     Jake A Jennifer is a 50 year old  "male with history of pulm hypertension, quit smoking after 30 years of tobacco abuse, yearly bronchitis who presents to the ER with complaints of cough.    Patient has had a cough now for several weeks.  He was seen in the emergency department back on May 19, 2025 for cough and diagnosed with possible bronchitis.  Sent home with prescription for albuterol, prednisone, and Augmentin.  He states initially he felt a little bit better but now the cough has returned.    When asked if he used the albuterol inhaler he states \"I did not use it correctly \".  He states that he did not understand how to actually use it correctly.  He states that he finished the 7-day course of Augmentin but that \"usually they give me 10 days \".     He continues to have a cough without any fevers, chest pain, abdominal pain, vomiting or diarrhea.    5 days ago a friend of his gave him a really hard bearhug and he has had some right sided chest pain ever since then.  Pain did not state until the next day after the big bear hug.  He is concerned that maybe he has a broken rib.    Now with coughs he does notice a little bit of blood in it.    He has not followed up with primary care as he felt it was better to come back to the emergency department since we had seen him just a couple weeks ago.    He has not tried any over-the-counter cough suppressants.  He also has a history of seasonal allergies but states he has not tried any of those kind of medications as this feels different.    CHART REVIEW:  Review shows the patient was seen on May 19, 2025 for cough and nasal congestion.  Had chest x-ray which was unremarkable.  Ultimately sent out with albuterol, Augmentin, and prednisone.    I also see a telemedicine visit from [place] 3, 2025 at which time they did not feel there was any medical emergency occurring but because this was a respiratory related complaint they recommended that he be seen in person instead of telemedicine and encouraged him to " make an appointment to be seen in clinic or urgent care.      REVIEW OF SYSTEMS  Review of Systems   Constitutional:  Negative for fever.   Respiratory:  Positive for cough and shortness of breath.    Cardiovascular:  Positive for chest pain (right rib pain after bear hug from a friend).   Gastrointestinal:  Negative for abdominal pain, diarrhea, nausea and vomiting.   Genitourinary:  Negative for dysuria.   All other systems reviewed and are negative.      PAST MEDICAL HISTORY:  Past Medical History:   Diagnosis Date    Pulmonary hypertension (H)          PAST SURGICAL HISTORY:  Past Surgical History:   Procedure Laterality Date    ADENOIDECTOMY      HERNIA REPAIR      age 3-4    SURGICAL PATHOLOGY EXAM  1994         CURRENT MEDICATIONS:    Prior to Admission medications    Medication Sig Start Date End Date Taking? Authorizing Provider   albuterol (PROAIR HFA/PROVENTIL HFA/VENTOLIN HFA) 108 (90 Base) MCG/ACT inhaler Inhale 2 puffs into the lungs every 6 hours as needed for shortness of breath, wheezing or cough. 5/20/25   Anaya Ross PA-C   buPROPion (WELLBUTRIN XL) 150 MG 24 hr tablet Take 1 tablet (150 mg) by mouth every morning. 4/17/25 4/12/26  Ru Cho DO   buPROPion (WELLBUTRIN XL) 300 MG 24 hr tablet Take 1 tablet (300 mg) by mouth every morning. 4/17/25 4/12/26  Ru Cho DO   furosemide (LASIX) 40 MG tablet Take 2 tablets (80 mg) by mouth daily. 4/17/25   Ru Cho DO   furosemide (LASIX) 80 MG tablet Take 1 tablet (80 mg) by mouth daily. 5/1/25 10/28/25  Ru Cho DO         ALLERGIES:  Allergies   Allergen Reactions    Alupent [Metaproterenol] Other (See Comments)     Other reaction(s): *Unknown - Childhood Rxn, Unknown, pt doesn't recall.         FAMILY HISTORY:  Family History   Problem Relation Age of Onset    Hypertension Mother     Atrial fibrillation Mother     Pulmonary fibrosis Father         dx late 40s, passed age 56    Heart Disease Sister     Hypertension Sister      Colon Cancer No family hx of          SOCIAL HISTORY:  Social History     Socioeconomic History    Marital status:    Tobacco Use    Smoking status: Former     Current packs/day: 0.00     Average packs/day: 0.5 packs/day for 33.0 years (16.5 ttl pk-yrs)     Types: Cigarettes     Start date:      Quit date: 2025     Years since quittin.4     Passive exposure: Past    Smokeless tobacco: Never   Vaping Use    Vaping status: Never Used   Substance and Sexual Activity    Alcohol use: Yes     Alcohol/week: 5.0 standard drinks of alcohol     Types: 5 Standard drinks or equivalent per week    Drug use: Yes     Types: Marijuana, Amphetamines    Sexual activity: Yes     Partners: Female   Social History Narrative    R[employer]     Social Drivers of Health     Financial Resource Strain: Low Risk  (2024)    Financial Resource Strain     Within the past 12 months, have you or your family members you live with been unable to get utilities (heat, electricity) when it was really needed?: No   Food Insecurity: Low Risk  (2024)    Food Insecurity     Within the past 12 months, did you worry that your food would run out before you got money to buy more?: No     Within the past 12 months, did the food you bought just not last and you didn t have money to get more?: No   Transportation Needs: Low Risk  (2024)    Transportation Needs     Within the past 12 months, has lack of transportation kept you from medical appointments, getting your medicines, non-medical meetings or appointments, work, or from getting things that you need?: No   Physical Activity: Insufficiently Active (2024)    Exercise Vital Sign     Days of Exercise per Week: 2 days     Minutes of Exercise per Session: 60 min   Stress: Stress Concern Present (2024)    [place] [employer] - Occupational Stress Questionnaire     Feeling of Stress : Very much   Social Connections: Unknown (2024)    Social  Connection and Isolation Panel [NHANES]     Frequency of Social Gatherings with Friends and Family: Three times a week   Interpersonal Safety: Low Risk  (11/5/2024)    Interpersonal Safety     Do you feel physically and emotionally safe where you currently live?: Yes     Within the past 12 months, have you been hit, slapped, kicked or otherwise physically hurt by someone?: No     Within the past 12 months, have you been humiliated or emotionally abused in other ways by your partner or ex-partner?: No   Housing Stability: Low Risk  (11/5/2024)    Housing Stability     Do you have housing? : Yes     Are you worried about losing your housing?: No         VITALS:  Patient Vitals for the past 24 hrs:   BP Temp Temp src Pulse Resp SpO2 Weight   06/05/25 0815 128/80 -- -- 92 20 95 % --   06/05/25 0801 123/76 -- -- 89 27 97 % --   06/05/25 0750 125/78 -- -- 93 21 100 % --   06/05/25 0711 -- -- -- 98 24 -- --   06/05/25 0613 131/82 98  F (36.7  C) Oral 103 16 98 % 115.2 kg (254 lb)       Wt Readings from Last 3 Encounters:   06/05/25 115.2 kg (254 lb)   05/19/25 115.7 kg (255 lb)   04/17/25 119.3 kg (262 lb 14.4 oz)       Estimated Creatinine Clearance: 117.2 mL/min (based on SCr of 1.04 mg/dL).    PHYSICAL EXAM    Constitutional:  Well developed, Well nourished, NAD  HENT:  Normocephalic, Atraumatic, Bilateral external ears normal, Nose normal. Neck- Supple, No stridor.   Eyes:  PERRL, EOMI, Conjunctiva normal, No discharge.  Respiratory:  Normal breath sounds, No respiratory distress, No wheezing, Speaks full sentences easily without cough. +rare dry cough.   Cardiovascular:  Normal heart rate, Regular rhythm, No murmurs, No rubs, No gallops.   GI:  No excessive obesity.  Bowel sounds normal, Soft, No tenderness, No masses, No flank tenderness. No rebound or guarding.   : deferred  Musculoskeletal: No cyanosis, No clubbing. Good range of motion in all major joints. No major deformities noted.   Integument:  Warm, Dry, No  erythema, No rash.  No petechiae.  Neurologic:  Alert & oriented x 3  Psychiatric:  Affect normal, Cooperative      LAB:  All pertinent labs reviewed and interpreted.  Recent Results (from the past 24 hours)   Basic metabolic panel    Collection Time: 06/05/25  6:33 AM   Result Value Ref Range    Sodium 134 (L) 135 - 145 mmol/L    Potassium 4.0 3.4 - 5.3 mmol/L    Chloride 99 98 - 107 mmol/L    Carbon Dioxide (CO2) 29 22 - 29 mmol/L    Anion Gap 6 (L) 7 - 15 mmol/L    Urea Nitrogen 12.5 6.0 - 20.0 mg/dL    Creatinine 1.04 0.67 - 1.17 mg/dL    GFR Estimate 89 >60 mL/min/1.73m2    Calcium 8.8 8.8 - 10.4 mg/dL    Glucose 104 (H) 70 - 99 mg/dL   INR    Collection Time: 06/05/25  6:33 AM   Result Value Ref Range    INR 1.00 0.85 - 1.15    PT 13.5 11.8 - 14.8 Seconds   PTT    Collection Time: 06/05/25  6:33 AM   Result Value Ref Range    aPTT 26 22 - 38 Seconds   Extra Blue Top Tube    Collection Time: 06/05/25  6:33 AM   Result Value Ref Range    Hold Specimen JIC    Extra Red Top Tube    Collection Time: 06/05/25  6:33 AM   Result Value Ref Range    Hold Specimen JIC    Extra Blood Bank Purple Top Tube    Collection Time: 06/05/25  6:33 AM   Result Value Ref Range    Hold Specimen JIC    CBC with platelets and differential    Collection Time: 06/05/25  6:33 AM   Result Value Ref Range    WBC Count 7.1 4.0 - 11.0 10e3/uL    RBC Count 4.16 (L) 4.40 - 5.90 10e6/uL    Hemoglobin 12.8 (L) 13.3 - 17.7 g/dL    Hematocrit 37.4 (L) 40.0 - 53.0 %    MCV 90 78 - 100 fL    MCH 30.8 26.5 - 33.0 pg    MCHC 34.2 31.5 - 36.5 g/dL    RDW 12.4 10.0 - 15.0 %    Platelet Count 190 150 - 450 10e3/uL    % Neutrophils 70 %    % Lymphocytes 15 %    % Monocytes 12 %    % Eosinophils 1 %    % Basophils 0 %    % Immature Granulocytes 0 %    NRBCs per 100 WBC 0 <1 /100    Absolute Neutrophils 5.0 1.6 - 8.3 10e3/uL    Absolute Lymphocytes 1.1 0.8 - 5.3 10e3/uL    Absolute Monocytes 0.9 0.0 - 1.3 10e3/uL    Absolute Eosinophils 0.1 0.0 - 0.7 10e3/uL  "   Absolute Basophils 0.0 0.0 - 0.2 10e3/uL    Absolute Immature Granulocytes 0.0 <=0.4 10e3/uL    Absolute NRBCs 0.0 10e3/uL   NT-proBNP    Collection Time: 06/05/25  6:33 AM   Result Value Ref Range    NT-proBNP 156 (H) 0 - 138 pg/mL   Procalcitonin    Collection Time: 06/05/25  6:33 AM   Result Value Ref Range    Procalcitonin 0.08 <0.50 ng/mL   Troponin T, High Sensitivity    Collection Time: 06/05/25  6:33 AM   Result Value Ref Range    Troponin T, High Sensitivity 9 <=22 ng/L   CT Chest Pulmonary Embolism w Contrast    Collection Time: 06/05/25  7:39 AM   Result Value Ref Range    Radiologist flags New diagnosis of pulmonary embolism (AA)        No results found for: \"ABORH\"        RADIOLOGY:  Reviewed all pertinent imaging. Please see official radiology report.    CT Chest Pulmonary Embolism w Contrast   Final Result   Abnormal   IMPRESSION:   1.  Study is positive for central to subsegmental pulmonary emboli to the right and left lung. Right heart strain is possible. There is a superior segment right lower lobe pulmonary infarction. Consider IR consultation.         [Critical Result: New diagnosis of pulmonary embolism]      Finding was identified on 6/5/2025 8:04 AM CDT.       Dr. Gunter was contacted by me on 6/5/2025 8:08 AM CDT and verbalized understanding of the critical result.       Echocardiogram Complete    (Results Pending)         EKG:    Indication: Chest pain    Performed at: 06:42a  Impression: Sinus rhythm at 95 bpm.  Flipped T waves noted in lead aVR, aVL and V1-V2.  DE interval 152 ms, QRS 92 ms, QTc 475 ms.  Overall nonspecific EKG.  No prior EKGs for comparison.      I have independently reviewed and interpreted the EKG(s) documented above.        PROCEDURES:  none    Medical Decision Making  I reviewed the EMR: Outpatient Record: see HPI  I discussed the care with another health care provider: see consults  Admit.    MIPS (CTPE, Dental pain, Farfan, Sinusitis, Asthma/COPD, Head Trauma): " CT Pulmonary Angiogram:Patient is moderate to high risk for PE.    SEPSIS: None    Belinda Gunter M.D. [facility]  Emergency Medicine and Medical Toxicology  [facility] EMERGENCY DEPARTMENT  [address] [place] [id number] [phone]  Dept: [phone]           Belinda Gunter MD  06/05/25 0813

## 2025-06-05 NOTE — H&P
"ADMISSION HISTORY & PHYSICAL      Ru Cho, [phone]  ASSESSMENT AND PLAN:  50 year old male with a history of pulmonary hypertension and tobacco use who presents with cough and chest pain.  Admitted to the hospital with pulmonary embolism.      Acute central and subsegmental bilateral pulmonary embolism  Right lower lobe pulmonary infarct  CT chest reviewed.  IR with no plans for intervention  Check ECHO  Heparin drip  Pharmacy consult for price coverage of Xarelto and Eliquis  Check lower extremity Dopplers    History of tobacco use    Mood disorder  Resume Wellbutrin    Code Status: Full Code  DVT prophylaxis: Heparin drip    Disposition:  -Anticipated Length of Stay in midnights and medical necessity (including a midnight in the Emergency Department after triage if applicable): 1-2 nights -Discharge barriers: ECHO, lower extremity Dopplers, Romero coverage    Clinically Significant Risk Factors Present on Admission         # Hyponatremia: Lowest Na = 134 mmol/L in last 2 days, will monitor as appropriate                     # Obesity: Estimated body mass index is 32.61 kg/m  as calculated from the following:    Height as of 5/19/25: 1.88 m (6' 2\").    Weight as of this encounter: 115.2 kg (254 lb).                Medically Ready for Discharge: Anticipated Tomorrow        CHIEF COMPLAINT:  Chest pain     HISTORY OF PRESENTING ILLNESS:  Patient is a 50 year old male with history significant for mood disorder, tobacco use who presents with chest pain and cough.  Patient has been experiencing cough for several weeks and has received treatment for possible bronchitis.  Return to the emergency department with persistent cough along with right-sided chest pain and small amount of hemoptysis.  Found to have bilateral pulmonary embolism.  Patient denies other symptoms of fever, chills, diarrhea.  He is very anxious about dying.  No other complaints.    PMH/PSH:  Patient Active Problem List   Diagnosis    Vitamin D " deficiency    Hemoptysis    Acute pulmonary embolism with acute cor pulmonale, unspecified pulmonary embolism type (H)    Acute cough       ALLERGIES:  Allergies   Allergen Reactions    Alupent [Metaproterenol] Other (See Comments)     Other reaction(s): *Unknown - Childhood Rxn, Unknown, pt doesn't recall.       MEDICATIONS:  Reviewed.  Current Facility-Administered Medications   Medication Dose Route Frequency Provider Last Rate Last Admin    acetaminophen (TYLENOL) tablet 650 mg  650 mg Oral Q4H PRN Belinda Morales DO        Or    acetaminophen (TYLENOL) Suppository 650 mg  650 mg Rectal Q4H PRN Belinda Morales DO        albuterol (PROVENTIL HFA/VENTOLIN HFA) inhaler  2 puff Inhalation Q6H PRN Belinda Morales DO        artificial saliva (BIOTENE MT) solution 1 spray  1 spray Mouth/Throat 4x Daily Belinda Morales DO        benzocaine-menthol (CHLORASEPTIC) 6-10 MG lozenge 1 lozenge  1 lozenge Buccal Q1H PRN Belinda Morales DO        buPROPion (WELLBUTRIN XL) 24 hr tablet 150 mg  150 mg Oral QAM Belinda Morales DO        buPROPion (WELLBUTRIN XL) 24 hr tablet 300 mg  300 mg Oral QAM Belinda Morales DO        calcium carbonate (TUMS) chewable tablet 1,000 mg  1,000 mg Oral 4x Daily PRN Belinda Morales DO        furosemide (LASIX) tablet 80 mg  80 mg Oral Daily Belinda Morales DO        heparin 25,000 units in 0.45% NaCl 250 mL ANTICOAGULANT infusion  0-5,000 Units/hr Intravenous Continuous Belinda Gunter MD 18 mL/hr at 06/05/25 0843 1,800 Units/hr at 06/05/25 0843    lidocaine (LMX4) cream   Topical Q1H PRN Belinda Morales DO        lidocaine 1 % 0.1-1 mL  0.1-1 mL Other Q1H PRN Belinda Morales DO        melatonin tablet 5 mg  5 mg Oral At Bedtime PRN Belinda Morales DO        ondansetron (ZOFRAN ODT) ODT tab 4 mg  4 mg Oral Q6H PRN Belinda Morales DO        Or     ondansetron (ZOFRAN) injection 4 mg  4 mg Intravenous Q6H PRN Belinda Morales DO        oxyCODONE (ROXICODONE) tablet 5 mg  5 mg Oral Q4H PRN Belinda Morales DO        oxyCODONE IR (ROXICODONE) half-tab 2.5 mg  2.5 mg Oral Q4H PRN Belinda Morales DO        Patient is already receiving anticoagulation with heparin, enoxaparin (LOVENOX), warfarin (COUMADIN)  or other anticoagulant medication   Does not apply Continuous PRN Belinda Morales DO        senna-docusate (SENOKOT-S/PERICOLACE) 8.6-50 MG per tablet 1 tablet  1 tablet Oral BID PRN Belinda Morales DO        Or    senna-docusate (SENOKOT-S/PERICOLACE) 8.6-50 MG per tablet 2 tablet  2 tablet Oral BID PRN Belinda Morales DO        sodium chloride (PF) 0.9% PF flush 3 mL  3 mL Intracatheter Q8H BARBARA Belinda Morales DO        sodium chloride (PF) 0.9% PF flush 3 mL  3 mL Intracatheter q1 min prBelinda Scott DO         Current Outpatient Medications   Medication Sig Dispense Refill    albuterol (PROAIR HFA/PROVENTIL HFA/VENTOLIN HFA) 108 (90 Base) MCG/ACT inhaler Inhale 2 puffs into the lungs every 6 hours as needed for shortness of breath, wheezing or cough. 18 g 0    buPROPion (WELLBUTRIN XL) 150 MG 24 hr tablet Take 1 tablet (150 mg) by mouth every morning. 90 tablet 3    buPROPion (WELLBUTRIN XL) 300 MG 24 hr tablet Take 1 tablet (300 mg) by mouth every morning. 90 tablet 3    furosemide (LASIX) 80 MG tablet Take 1 tablet (80 mg) by mouth daily. 90 tablet 1       SOCIAL HISTORY:  Social History     Socioeconomic History    Marital status:      Spouse name: Not on file    Number of children: Not on file    Years of education: Not on file    Highest education level: Not on file   Occupational History    Not on file   Tobacco Use    Smoking status: Former     Current packs/day: 0.00     Average packs/day: 0.5 packs/day for 33.0 years (16.5 ttl pk-yrs)     Types:  Cigarettes     Start date:      Quit date: 2025     Years since quittin.4     Passive exposure: Past    Smokeless tobacco: Never   Vaping Use    Vaping status: Never Used   Substance and Sexual Activity    Alcohol use: Yes     Alcohol/week: 5.0 standard drinks of alcohol     Types: 5 Standard drinks or equivalent per week    Drug use: Yes     Types: Marijuana, Amphetamines    Sexual activity: Yes     Partners: Female   Other Topics Concern    Not on file   Social History Narrative    R[employer]     Social Drivers of Health     Financial Resource Strain: Low Risk  (2024)    Financial Resource Strain     Within the past 12 months, have you or your family members you live with been unable to get utilities (heat, electricity) when it was really needed?: No   Food Insecurity: Low Risk  (2024)    Food Insecurity     Within the past 12 months, did you worry that your food would run out before you got money to buy more?: No     Within the past 12 months, did the food you bought just not last and you didn t have money to get more?: No   Transportation Needs: Low Risk  (2024)    Transportation Needs     Within the past 12 months, has lack of transportation kept you from medical appointments, getting your medicines, non-medical meetings or appointments, work, or from getting things that you need?: No   Physical Activity: Insufficiently Active (2024)    Exercise Vital Sign     Days of Exercise per Week: 2 days     Minutes of Exercise per Session: 60 min   Stress: Stress Concern Present (2024)    [employer] - Occupational Stress Questionnaire     Feeling of Stress : Very much   Social Connections: Unknown (2024)    Social Connection and Isolation Panel [NHANES]     Frequency of Communication with Friends and Family: Not on file     Frequency of Social Gatherings with Friends and Family: Three times a week     Attends Confucianism Services: Not on file      Active Member of Clubs or Organizations: Not on file     Attends Club or Organization Meetings: Not on file     Marital Status: Not on file   Interpersonal Safety: Low Risk  (11/5/2024)    Interpersonal Safety     Do you feel physically and emotionally safe where you currently live?: Yes     Within the past 12 months, have you been hit, slapped, kicked or otherwise physically hurt by someone?: No     Within the past 12 months, have you been humiliated or emotionally abused in other ways by your partner or ex-partner?: No   Housing Stability: Low Risk  (11/5/2024)    Housing Stability     Do you have housing? : Yes     Are you worried about losing your housing?: No       FAMILY HISTORY:  Family History   Problem Relation Age of Onset    Hypertension Mother     Atrial fibrillation Mother     Pulmonary fibrosis Father         dx late 40s, passed age 56    Heart Disease Sister     Hypertension Sister     Colon Cancer No family hx of        ROS:  12 point review of systems is reviewed and is negative except for what has already been mention in the history of presenting illness.      PHYSICAL EXAM:  /80   Pulse 91   Temp 98  F (36.7  C) (Oral)   Resp 24   Wt 115.2 kg (254 lb)   SpO2 97%   BMI 32.61 kg/m    No intake/output data recorded.  No intake/output data recorded.  GENRL: Alert and answering questions appropriately. Anxious. Not in acute distress. Lying in bed   CHEST: Clear to auscultation bilaterally. No wheezes, rhonchi or crackles. Breathing easily   HEART: Tachycardic, S1S2 auscultated. No murmurs, rubs or gallops.   EXTRM: + pedal edema  NEURO: No focal neurological deficit. No involuntary movements. Normal mentation  PSYCH: Anxious affect and mood.   INTGM: No skin rash    Medical Decision Making       75 MINUTES SPENT BY ME on the date of service doing chart review, history, exam, documentation & further activities per the note.        DIAGNOSTIC DATA:  Recent Results (from the past 24 hours)    Basic metabolic panel    Collection Time: 06/05/25  6:33 AM   Result Value Ref Range    Sodium 134 (L) 135 - 145 mmol/L    Potassium 4.0 3.4 - 5.3 mmol/L    Chloride 99 98 - 107 mmol/L    Carbon Dioxide (CO2) 29 22 - 29 mmol/L    Anion Gap 6 (L) 7 - 15 mmol/L    Urea Nitrogen 12.5 6.0 - 20.0 mg/dL    Creatinine 1.04 0.67 - 1.17 mg/dL    GFR Estimate 89 >60 mL/min/1.73m2    Calcium 8.8 8.8 - 10.4 mg/dL    Glucose 104 (H) 70 - 99 mg/dL   INR    Collection Time: 06/05/25  6:33 AM   Result Value Ref Range    INR 1.00 0.85 - 1.15    PT 13.5 11.8 - 14.8 Seconds   PTT    Collection Time: 06/05/25  6:33 AM   Result Value Ref Range    aPTT 26 22 - 38 Seconds   Extra Blue Top Tube    Collection Time: 06/05/25  6:33 AM   Result Value Ref Range    Hold Specimen JIC    Extra Red Top Tube    Collection Time: 06/05/25  6:33 AM   Result Value Ref Range    Hold Specimen JIC    Extra Blood Bank Purple Top Tube    Collection Time: 06/05/25  6:33 AM   Result Value Ref Range    Hold Specimen JIC    CBC with platelets and differential    Collection Time: 06/05/25  6:33 AM   Result Value Ref Range    WBC Count 7.1 4.0 - 11.0 10e3/uL    RBC Count 4.16 (L) 4.40 - 5.90 10e6/uL    Hemoglobin 12.8 (L) 13.3 - 17.7 g/dL    Hematocrit 37.4 (L) 40.0 - 53.0 %    MCV 90 78 - 100 fL    MCH 30.8 26.5 - 33.0 pg    MCHC 34.2 31.5 - 36.5 g/dL    RDW 12.4 10.0 - 15.0 %    Platelet Count 190 150 - 450 10e3/uL    % Neutrophils 70 %    % Lymphocytes 15 %    % Monocytes 12 %    % Eosinophils 1 %    % Basophils 0 %    % Immature Granulocytes 0 %    NRBCs per 100 WBC 0 <1 /100    Absolute Neutrophils 5.0 1.6 - 8.3 10e3/uL    Absolute Lymphocytes 1.1 0.8 - 5.3 10e3/uL    Absolute Monocytes 0.9 0.0 - 1.3 10e3/uL    Absolute Eosinophils 0.1 0.0 - 0.7 10e3/uL    Absolute Basophils 0.0 0.0 - 0.2 10e3/uL    Absolute Immature Granulocytes 0.0 <=0.4 10e3/uL    Absolute NRBCs 0.0 10e3/uL   NT-proBNP    Collection Time: 06/05/25  6:33 AM   Result Value Ref Range     NT-proBNP 156 (H) 0 - 138 pg/mL   Procalcitonin    Collection Time: 06/05/25  6:33 AM   Result Value Ref Range    Procalcitonin 0.08 <0.50 ng/mL   Troponin T, High Sensitivity    Collection Time: 06/05/25  6:33 AM   Result Value Ref Range    Troponin T, High Sensitivity 9 <=22 ng/L   CT Chest Pulmonary Embolism w Contrast    Collection Time: 06/05/25  7:39 AM   Result Value Ref Range    Radiologist flags New diagnosis of pulmonary embolism (AA)    Troponin T, High Sensitivity    Collection Time: 06/05/25  8:32 AM   Result Value Ref Range    Troponin T, High Sensitivity 9 <=22 ng/L     All lab studies reviewed personally  Radiology report reviewed.      Belinda Morales, DO  Hospitalist Service  [facility]

## 2025-06-05 NOTE — PHARMACY-ADMISSION MEDICATION HISTORY
Pharmacist Admission Medication History    Admission medication history is complete. The information provided in this note is only as accurate as the sources available at the time of the update.    Information Source(s): Patient via in-person    Pertinent Information: Patient last took his home medications yesterday 6/4/25.     Update: 10:30 a.m. patient stated he is only taking 300 mg Wellbutrin XL at this time. Provider updated and 150 mg tablet was taken off med list.   Changes made to PTA medication list:  Added: None  Deleted: Furosemide 40 mg Wellbutrin  mg   Changed: None    Allergies reviewed with patient and updates made in EHR: yes    Medication History Completed By: JULIAN RUBY RPH 6/5/2025 9:00 AM    PTA Med List   Medication Sig Last Dose/Taking    albuterol (PROAIR HFA/PROVENTIL HFA/VENTOLIN HFA) 108 (90 Base) MCG/ACT inhaler Inhale 2 puffs into the lungs every 6 hours as needed for shortness of breath, wheezing or cough. More than a month    buPROPion (WELLBUTRIN XL) 300 MG 24 hr tablet Take 1 tablet (300 mg) by mouth every morning. 6/4/2025 Morning    furosemide (LASIX) 80 MG tablet Take 1 tablet (80 mg) by mouth daily. 6/4/2025 Morning

## 2025-06-05 NOTE — ED TRIAGE NOTES
Pt reports recent bronchitis 2 weeks ago. He was discharged on antibiotics. His symptoms improved but did not go away. The symptoms have not gotten worse again. He also reports pain in the right side of the chest. He thinks that this is due to an injury he sustained while wrestling 5 days ago. He reports the pain is worse when he coughs. He has been coughing up blood for 24 hours.

## 2025-06-05 NOTE — LETTER
June 5, 2025      To Whom It May Concern:      Jake Rodriguez was admitted to [facility] on 6/5/2025. Please excuse from work 6/6/2025.     Sincerely,        Belinda Morales, DO  Electronically signed

## 2025-06-05 NOTE — PROVIDER NOTIFICATION
MD paged re: OK to discharge prior to US results?   Response: yes, ok to discharge home.     Noelle Sesay RN'

## 2025-06-05 NOTE — CONSULTS
6/5 Test claim for DOAC'S- Both Eliquis and Xarelto are covered medications $100 for 30 days supply, patient can use a copay assistance card forund easgabrieley online to bring the final cost to $10 for 30 days supply. Vouchers avail in the discharge pharmacy if needed for the first 30 days. Thank you for allowing me to help with your patient  Elizabeth Cruz [facility]  Pharmacy Discharge Liaison   [facility] locations  Available on [facility] and [facility]

## 2025-06-06 LAB
ATRIAL RATE - MUSE: 95 BPM
DIASTOLIC BLOOD PRESSURE - MUSE: NORMAL MMHG
INTERPRETATION ECG - MUSE: NORMAL
P AXIS - MUSE: 68 DEGREES
PR INTERVAL - MUSE: 152 MS
QRS DURATION - MUSE: 92 MS
QT - MUSE: 378 MS
QTC - MUSE: 475 MS
R AXIS - MUSE: 102 DEGREES
SYSTOLIC BLOOD PRESSURE - MUSE: NORMAL MMHG
T AXIS - MUSE: 79 DEGREES
VENTRICULAR RATE- MUSE: 95 BPM

## 2025-06-06 NOTE — DISCHARGE SUMMARY
"[facility]  Hospitalist Discharge Summary      Date of Admission:  6/5/2025  Date of Discharge:  6/5/2025  5:45 PM  Discharging Provider: Belinda Morales DO  Discharge Service: Hospitalist Service    Discharge Diagnoses   Bilateral PE  Left lower extremity DVT  Chronic RV strain from pulmonary hypertension    Clinically Significant Risk Factors     # Obesity: Estimated body mass index is 32.61 kg/m  as calculated from the following:    Height as of 5/19/25: 1.88 m (6' 2\").    Weight as of this encounter: 115.2 kg (254 lb).       Follow-ups Needed After Discharge   Follow-up Appointments       Hospital Follow-up with Existing Primary Care Provider (PCP)          Schedule Primary Care visit within: 7 Days           Patient will follow-up with his cardiology team regarding chronic RV strain from pulmonary hypertension    Unresulted Labs Ordered in the Past 30 Days of this Admission       No orders found for last 31 day(s).            Discharge Disposition   Discharged to home  Condition at discharge: Stable    Hospital Course   Patient presented to the hospital with subacute cough and found to have bilateral PE.  Also was complaining of lower extremity edema intermittently over the last few months.  Lower extremity Dopplers with left lower extremity DVT.  Patient was initiated on Xarelto and discharged in stable condition.    Consultations This Hospital Stay   PHARMACY IP CONSULT  PHARMACY LIAISON FOR MEDICATION COVERAGE CONSULT    Code Status   Prior    Time Spent on this Encounter   I, Belinda Morales DO, personally saw the patient today and spent greater than 30 minutes discharging this patient.       DO JORDAN Jolly [address] [address]  Phone: [phone]  Fax: [phone]  ______________________________________________________________________    Physical Exam   Vital Signs: Temp: 99  F (37.2  C) Temp " src: Axillary BP: 116/79 Pulse: 99   Resp: 20 SpO2: 93 % O2 Device: None (Room air)    Weight: 254 lbs 0 oz         Primary Care Physician   Ru Cho    Discharge Orders      Adult Oncology/Hematology  Referral      Reason for your hospital stay    Bilateral Pulmonary embolism     Activity    Your activity upon discharge: activity as tolerated     Diet    Follow this diet upon discharge: Current Diet:Orders Placed This Encounter      Combination Diet Regular Diet Adult     Hospital Follow-up with Existing Primary Care Provider (PCP)            Significant Results and Procedures   Most Recent 3 CBC's:  Recent Labs   Lab Test 06/05/25  0633 01/13/25  1427   WBC 7.1 5.1   HGB 12.8* 14.4   MCV 90 90    184     Most Recent 3 BMP's:  Recent Labs   Lab Test 06/05/25  0633 04/30/25  1650 02/19/25  0817   * 140 139   POTASSIUM 4.0 4.1 3.9   CHLORIDE 99 104 104   CO2 29 25 28   BUN 12.5 14.7 11.8   CR 1.04 1.20* 1.11   ANIONGAP 6* 11 7   KRISTEN 8.8 9.3 9.2   * 118* 117*     Most Recent 2 LFT's:  Recent Labs   Lab Test 01/13/25  1427   AST 16   ALT 14   ALKPHOS 58   BILITOTAL 0.2   ,   Results for orders placed or performed during the hospital encounter of 06/05/25   CT Chest Pulmonary Embolism w Contrast     Value    Radiologist flags New diagnosis of pulmonary embolism (AA)    Narrative    EXAM: CT CHEST PULMONARY EMBOLISM W CONTRAST  LOCATION: [facility]  DATE: 6/5/2025    INDICATION: cough, hemoptysis  COMPARISON: 2/20/2025  TECHNIQUE: CT chest pulmonary angiogram during arterial phase injection of IV contrast. Multiplanar reformats and MIP reconstructions were performed. Dose reduction techniques were used.   CONTRAST: IsoVue 370 90ml    FINDINGS:  ANGIOGRAM CHEST: The pulmonary arteries are mildly enlarged. Study is positive for central, lobar, segmental, and subsegmental filling defects to the right left lung. The RV/LV ratio is 55/49. No aortic dissection.    LUNGS  AND PLEURA: Peripheral groundglass opacity in the superior segment of the right lower lobe measuring 8.0 x 3.6 cm. Small right pleural effusion.    MEDIASTINUM/AXILLAE: Calcified mediastinal and hilar lymph nodes from prior granulomatous disease.    CORONARY ARTERY CALCIFICATION:    UPPER ABDOMEN: Normal.    MUSCULOSKELETAL: Normal.      Impression    IMPRESSION:  1.  Study is positive for central to subsegmental pulmonary emboli to the right and left lung. Right heart strain is possible. There is a superior segment right lower lobe pulmonary infarction. Consider IR consultation.      [Critical Result: New diagnosis of pulmonary embolism]    Finding was identified on 6/5/2025 8:04 AM CDT.     Dr. Gunter was contacted by me on 6/5/2025 8:08 AM CDT and verbalized understanding of the critical result.    US Lower Extremity Venous Duplex Bilateral    Narrative    EXAM: US LOWER EXTREMITY VENOUS DUPLEX BILATERAL  LOCATION: [facility]  DATE: 6/5/2025    INDICATION: Edema.  COMPARISON: None.  TECHNIQUE: Venous Duplex ultrasound of bilateral lower extremities with and without compression, augmentation and duplex. Color flow and spectral Doppler with waveform analysis performed.    FINDINGS: Exam includes the common femoral, femoral, popliteal veins as well as segmentally visualized deep calf veins and greater saphenous vein.     RIGHT: No deep vein thrombosis. No superficial thrombophlebitis. No popliteal cyst.    LEFT: No superficial venous thrombosis.  Near occlusive thrombus is seen in the mid superficial femoral, distal superficial femoral, popliteal and a posterior tibial vein. No popliteal cyst.      Impression    IMPRESSION:  1.  No deep venous thrombosis in the right lower extremity.    2.  Near occlusive thrombus in the left mid/distal superficial femoral, popliteal and posterior tibial veins.      Findings relayed to Dr. Morales at 6:05 PM on 06/05/2025.   Echocardiogram Complete      Value    LVEF  55-60%    [facility]    [id number]  [id number]  [id number]  [id number]^RANDELL^JESUS ALBERTO^[place] [address]     Name: YESSENIA LOGAN  MRN: [id number]  : 1975  Study Date: 2025 09:45 AM  Age: 50 yrs  Gender: Male  Patient Location: [facility]  Reason For Study: Dyspnea  Ordering Physician: JESUS ALBERTO MEJIAS  Performed By: AD     BSA: 2.4 m2  Height: 74 in  Weight: 254 lb  HR: 104  BP: 128/80 mmHg  ______________________________________________________________________________  Procedure  Echocardiogram with two-dimensional, color and spectral Doppler. Definity (NDC  #18013-997) given intravenously. Adequate quality two-dimensional was  performed and interpreted. Compared to prior study, there is no significant  change.  ______________________________________________________________________________  Interpretation Summary     The left ventricle is normal in size. There is mild concentric left  ventricular hypertrophy.  Left ventricular systolic function is normal. The visual ejection fraction is  55-60%. No regional wall motion abnormalities noted.  Flattened septum is consistent with RV pressure overload.     The right ventricle is moderately dilated. The right ventricular systolic  function is normal.  Normal left atrial size. Borderline right atrial enlargement.  There is mild (1+) tricuspid regurgitation.  IVC diameter and respiratory changes fall into an intermediate range  suggesting an RA pressure of 8 mmHg.  Compared to prior study, there is no significant change.  ______________________________________________________________________________  Left Ventricle  The left ventricle is normal in size. There is mild concentric left  ventricular hypertrophy. Left ventricular systolic function is normal. The  visual ejection fraction is 55-60%. Left ventricular diastolic function is  indeterminate. No regional wall motion abnormalities  noted. Flattened septum  is consistent with RV pressure overload.     Right Ventricle  The right ventricle is moderately dilated. The right ventricular systolic  function is normal.     Atria  Normal left atrial size. Borderline right atrial enlargement.     Mitral Valve  Mitral valve leaflets appear normal. There is trace mitral regurgitation.  There is no mitral valve stenosis.     Tricuspid Valve  Tricuspid valve leaflets appear normal. There is mild (1+) tricuspid  regurgitation. Right ventricular systolic pressure could not be approximated  due to inadequate tricuspid regurgitation.     Aortic Valve  The aortic valve is trileaflet. Aortic valve leaflets appear normal. No aortic  regurgitation is present. No aortic stenosis is present.     Pulmonic Valve  The pulmonic valve is not well visualized. There is trace pulmonic valvular  regurgitation.     Vessels  The aorta root is normal. IVC diameter and respiratory changes fall into an  intermediate range suggesting an RA pressure of 8 mmHg.     Pericardium  There is no pericardial effusion.     Rhythm  Sinus rhythm was noted.  ______________________________________________________________________________  MMode/2D Measurements & Calculations     IVSd: 1.1 cm  LVIDd: 5.4 cm  LVIDs: 3.2 cm  LVPWd: 1.2 cm  FS: 41.0 %  LV mass(C)d: 244.7 grams  LV mass(C)dI: 101.7 grams/m2  Ao root diam: 3.1 cm  LA dimension: 3.8 cm  asc Aorta Diam: 3.8 cm  LA/Ao: 1.2  LVOT diam: 2.3 cm  LVOT area: 4.2 cm2  Ao root diam index Ht(cm/m): 1.7  Ao root diam index BSA (cm/m2): 1.3  Asc Ao diam index BSA (cm/m2): 1.6  Asc Ao diam index Ht(cm/m): 2.0  EF Biplane: 43.7 %     LA Volume Indexed (AL/bp): 20.4 ml/m2  RV Base: 5.3 cm  RWT: 0.43  TAPSE: 1.8 cm     Time Measurements  MM HR: 101.0 BPM     Doppler Measurements & Calculations  MV E max vamshi: 70.2 cm/sec  MV A max vamshi: 78.3 cm/sec  MV E/A: 0.90  MV dec slope: 453.2 cm/sec2  MV dec time: 0.15 sec  Ao V2 max: 158.2 cm/sec  Ao max PG: 10.0  mmHg  Ao V2 mean: 119.3 cm/sec  Ao mean P.4 mmHg  Ao V2 VTI: 25.2 cm  VENICE(I,D): 4.2 cm2  VENICE(V,D): 3.7 cm2  LV V1 max P.8 mmHg  LV V1 max: 140.0 cm/sec  LV V1 VTI: 25.2 cm  SV(LVOT): 105.0 ml  SI(LVOT): 43.6 ml/m2     PA acc time: 0.11 sec  AV Johann Ratio (DI): 0.88  VENICE Index (cm2/m2): 1.7  E/E': 11.7  Medial E/e': 11.7  Peak E' Johann: 6.0 cm/sec  RV S Johann: 11.3 cm/sec     ______________________________________________________________________________  Report approved by: Andreas Jung MD on 2025 10:58 AM             Discharge Medications   Discharge Medication List as of 2025  5:16 PM        START taking these medications    Details   Rivaroxaban ANTICOAGULANT 15 & 20 MG TBPK Starter Therapy Pack Take 15 mg by mouth 2 times daily (with meals) for 21 days, THEN 20 mg daily with food for 9 days. Continue as directed by provider., Disp-51 each, R-0, E-Prescribe           CONTINUE these medications which have NOT CHANGED    Details   albuterol (PROAIR HFA/PROVENTIL HFA/VENTOLIN HFA) 108 (90 Base) MCG/ACT inhaler Inhale 2 puffs into the lungs every 6 hours as needed for shortness of breath, wheezing or cough., Disp-18 g, R-0, E-PrescribePharmacy may dispense brand covered by insurance (Proair, or proventil or ventolin or generic albuterol inhaler)      buPROPion (WELLBUTRIN XL) 300 MG 24 hr tablet Take 1 tablet (300 mg) by mouth every morning., Disp-90 tablet, R-3, E-Prescribe      furosemide (LASIX) 80 MG tablet Take 1 tablet (80 mg) by mouth daily., Disp-90 tablet, R-1, E-Prescribe           Allergies   Allergies   Allergen Reactions    Alupent [Metaproterenol] Other (See Comments)     Other reaction(s): *Unknown - Childhood Rxn, Unknown, pt doesn't recall.

## 2025-06-10 ENCOUNTER — TELEPHONE (OUTPATIENT)
Dept: CARDIOLOGY | Facility: CLINIC | Age: 50
End: 2025-06-10
Payer: COMMERCIAL

## 2025-06-12 ENCOUNTER — OFFICE VISIT (OUTPATIENT)
Dept: FAMILY MEDICINE | Facility: CLINIC | Age: 50
End: 2025-06-12
Payer: COMMERCIAL

## 2025-06-12 ENCOUNTER — TELEPHONE (OUTPATIENT)
Dept: CARDIOLOGY | Facility: CLINIC | Age: 50
End: 2025-06-12

## 2025-06-12 VITALS
HEART RATE: 95 BPM | OXYGEN SATURATION: 97 % | BODY MASS INDEX: 32.51 KG/M2 | WEIGHT: 253.2 LBS | DIASTOLIC BLOOD PRESSURE: 80 MMHG | RESPIRATION RATE: 18 BRPM | SYSTOLIC BLOOD PRESSURE: 120 MMHG

## 2025-06-12 DIAGNOSIS — I26.09 ACUTE PULMONARY EMBOLISM WITH ACUTE COR PULMONALE, UNSPECIFIED PULMONARY EMBOLISM TYPE (H): Primary | ICD-10-CM

## 2025-06-12 DIAGNOSIS — I82.90 OCCLUSIVE THROMBUS: ICD-10-CM

## 2025-06-12 DIAGNOSIS — R04.2 HEMOPTYSIS: ICD-10-CM

## 2025-06-12 NOTE — TELEPHONE ENCOUNTER
Cath Lab Case Request/Order    Location: [address] [address] [facility] Waiting Room    Procedure: Right Heart Cath (RHC)    Procedure Date: 6/19    Patient Arrival Time: 630am    Procedure Time: 0830 (pending emergency)    Ordering Provider: Dr. Jaquelin Gonzalez    Performing Cardiologist: DARYA    Inpatient Bed Needed: No    Post-  Procedure ALTAGRACIA appointment scheduled (1 - 2 weeks): YES     Date: 7/3     Provider: Lisa    Communicated Patient Instructions:     NPO, nothing to eat 8 hours and drink 2 hours before arrival time: No     , need to arrange a ride home - unable to drive post- procedure: No     Adult at home, need a responsible adult to stay with patient 24 hours post- procedure: No    Appointment was scheduled: Over the phone    Patient expressed understanding of above instructions and denied further questions at this time.    Elizabeth Adamson

## 2025-06-12 NOTE — TELEPHONE ENCOUNTER
Sent staff msg to Catherine Fuchs & Elizabeth about cancelling RHC on 6/19.  Marcy North RN on 6/12/2025 at 3:49 PM

## 2025-06-12 NOTE — PROGRESS NOTES
"  Assessment & Plan     Acute pulmonary embolism with acute cor pulmonale, unspecified pulmonary embolism type (H)  Hemoptysis  Occlusive thrombus  Hospital follow-up for hemoptysis with multiple pulmonary embolisms and a occlusive thrombus throughout his left leg  He was originally on heparin and now is on Xarelto needs to be on this for a total of 3 months  He reports that he is feeling better than he has in a year  He is not having any chest pain or shortness of breath  The swelling in his leg is gone  The pain in his leg is gone  Patient still does have An appointment for a right heart cath for diagnosis of possible pulmonary hypertension however this could be from a longstanding PE burden  Recommend that he still go through with this  Patient does report that there is a family history of clotting disorder and he has an appointment with a hematologist which I would recommend he continue doing neck for workup of clotting disorder  Congratulated patient on feeling better  He was contacted within 48 hours of discharge.  He was discharged approximately 7 days ago.  Because of the nature of the DVT and pulmonary embolism along with treatment he does have a high risk of having return to hospitalization  Will continue monitoring his medications and lab values  Follow-up in 3 to 6 months    The longitudinal plan of care for the diagnosis(es)/condition(s) as documented were addressed during this visit. Due to the added complexity in care, I will continue to support Ant in the subsequent management and with ongoing continuity of care.        MED REC REQUIRED  Post Medication Reconciliation Status:     BMI  Estimated body mass index is 32.51 kg/m  as calculated from the following:    Height as of 5/19/25: 1.88 m (6' 2\").    Weight as of this encounter: 114.9 kg (253 lb 3.2 oz).             Subjective   Ant is a 50 year old, presenting for the following health issues:  Hospital F/U (Coughing up blood @ [facility] " 6/5/25  feeling a lot better, questions about meds )      6/12/2025     8:20 AM   Additional Questions   Roomed by KIMBERLY Juárez   Accompanied by SELF     HPI        Hospital Follow-up Visit:    For about 1.5 months before going to the ER was having joint space and some chest pains.  He was also having leg and ankle problems for quite a year  Thought it was bronchitis; tried steroids but did not get better  He started getting better but then got worse  He started coughing up blood so went to the ER  Found to have DVT and multiple Pe's  Started on heparin but now on Xarelto  Only has one month worth    No further ankle or joint pain  He has had pain so long  Feels physically better than he has in about a year  Hasn't used any of his drugs in the last 8 days. Went cold turkey and feels great    IMPRESSION:  1.  No deep venous thrombosis in the right lower extremity.     2.  Near occlusive thrombus in the left mid/distal superficial femoral, popliteal and posterior tibial veins.        Findings relayed to Dr. Morales at 6:05 PM on 06/05/2025.    IMPRESSION:  1.  Study is positive for central to subsegmental pulmonary emboli to the right and left lung. Right heart strain is possible. There is a superior segment right lower lobe pulmonary infarction. Consider IR consultation.        [Critical Result: New diagnosis of pulmonary embolism]     Finding was identified on 6/5/2025 8:04 AM CDT.      Dr. Gunter was contacted by me on 6/5/2025 8:08 AM CDT and verbalized understanding of the critical result.     Hospital/Nursing Home/IP Rehab Facility: [facility]  Most Recent Admission Date: 6/5/2025   Most Recent Admission Diagnosis: Hemoptysis - R04.2  Acute pulmonary embolism with acute cor pulmonale, unspecified pulmonary embolism type (H) - I26.09  Acute cough - R05.1     Most Recent Discharge Date: 6/5/2025   Most Recent Discharge Diagnosis: Acute cough - R05.1  Acute pulmonary embolism with acute  cor pulmonale, unspecified pulmonary embolism type (H) - I26.09  Hemoptysis - R04.2   Do you have any other stressors you would like to discuss with your provider? No    Problems taking medications regularly:  None  Medication changes since discharge: started xarelto, furosemide increased.   Problems adhering to non-medication therapy:  None    Summary of hospitalization:  [facility] discharge summary reviewed  Diagnostic Tests/Treatments reviewed.  Follow up needed: none  Other Healthcare Providers Involved in Patient s Care:         Specialist appointment - Hematologist, Cardiology  Update since discharge: improved.         Plan of care communicated with patient             Objective    /80   Pulse 95   Resp 18   Wt 114.9 kg (253 lb 3.2 oz)   SpO2 97%   BMI 32.51 kg/m    Body mass index is 32.51 kg/m .  Physical Exam  Vitals reviewed.   Constitutional:       Appearance: Normal appearance.   HENT:      Head: Normocephalic and atraumatic.      Right Ear: External ear normal.      Left Ear: External ear normal.      Nose: Nose normal.      Mouth/Throat:      Mouth: Mucous membranes are moist.   Eyes:      Extraocular Movements: Extraocular movements intact.      Pupils: Pupils are equal, round, and reactive to light.   Cardiovascular:      Rate and Rhythm: Normal rate and regular rhythm.      Heart sounds: Normal heart sounds.   Pulmonary:      Effort: Pulmonary effort is normal.      Breath sounds: Normal breath sounds.   Abdominal:      General: Abdomen is flat.      Palpations: Abdomen is soft.   Musculoskeletal:      Cervical back: Normal range of motion and neck supple.      Right lower leg: No edema.      Left lower leg: No edema.   Skin:     General: Skin is warm and dry.   Neurological:      General: No focal deficit present.      Mental Status: He is alert.   Psychiatric:         Mood and Affect: Mood normal.         Behavior: Behavior normal.              Signed Electronically by:  Ru Cho, DO

## 2025-06-12 NOTE — TELEPHONE ENCOUNTER
Called t to review pre-procedure education for upcoming RHC.  When I advised him to hold Xarelto for 48hours, he advised me he was nervous because he just had a new blood clot a week or 2 ago.  Advised him I would call Dr. Gonzalez and call him back.    Called Dr. Gonzalez and she advised pt should not have RHC for 3 months since he just had the new PE.  Keep the follow up appt in July and she will explain the new plan with him.  Agreed with plan. Marcy North RN on 6/12/2025 at 3:26 PM    LM that Dr. Gonzalez does not want him to have the RHC right now due to the recent PE.  She still wants to see him on the July appt, so I will keep that.  He should call me if he has any questions. Marcy North RN on 6/12/2025 at 3:31 PM

## 2025-06-13 NOTE — PROGRESS NOTES
"    [facility]  [address] [place] [address], [place] [address]  Main: [phone], Fax: [phone]    Patient seen at: [facility] at [address] [place] [address]    Outpatient Visit Note:    Patient: Jake Rodriguez  MRN: [id number]  : 1975  KATERYNA: 2025  Location of this writer at the time of this clinic visit was conducted: [facility].  Location of the patient at the time of this clinic visit was conducted: [facility].     Reason for visit:  Pulmonary embolism and left leg DVT on 2025.     HPI:  Ant is a 50 year old male with a history of mood disorder, tobacco use, and methamphetamine use, who is found to have pulmonary embolism and left leg DVT back on 2025, referred by Dr. Belinda Morales of [facility] for consultation. Currently he is on rivaroxaban at 15 mg PO BID dosing.     Ant has a long standing history of using methamphetamine for which in the recent years, he has been seen by Dr. 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 In fact, Ant reports that he used methamphetamine when he was in his 20s, then had about 10 years of sobriety but then restarted the habit about 6 years ago where he uses daily. Then about a week prior to his 2025 emergency department evaluation, he decided to quit \"cold turkey\" and has not used any methamphetamine since and he reportedly has done very well without any withdrawal symptoms. He also has recently quit smoking cigarettes.     Ant reports that when he used to use methamphetamine, he generally smokes it and in the past 6 years, he has not inject meth intravenously. He reports that he also has long standing history of bilateral leg pain with left leg more than his right. His pain " "Hello,    This patient is scheduled for his procedure with Dr. Schwab on 4/29/21. He would like to know if he could be prescribed some medication in the meantime, because he says he is \"dying from the pain.\" Patient would like to hear an update at the earliest convenience from the care team.    Thank you,    Margarette   Linda-Op Coordinator  " comes and goes and at times, his leg pain is so severe that they woke him up from sleep. He reports that he had undergone extensive workup in the past in regard to his leg pain without any significant findings. However for the past several months, his leg pain has not recur and especially after he was started on anticoagulation therapy after his VTE event on 6/5/2025, his leg pain seems to have completely resolved.     2/20/2025, he had a CT chest done for shortness of breath on exertion and hypertension. This CT showed no evidence of acute or chronic pulmonary embolism. Mild upper lung predominant dense fibrotic abnormality. Calcified mediastinal and hilar nodes. Findings may represent sequela of prior granulomatous infection or inflammatory process.     On 5/19/2025, he was seen at the emergency department with cough and nasal congestion for about 2 weeks. CXR showed chronic scarring in the right upper lobe, lungs otherwise clear. He was diagnosed with bronchitis and given inhaler, prednisone and antibiotics.     He initially felt improvement with his symptoms but then he started to have a cough once again. Although he felt ill between 5/19/2025 to 6/5/2025, he kept himself active and continued to go to work as a  at [employer].     Then around 6/1/2025, he recalls that he stopped using methamphetamine on his own and has not used ever since.    6/5/2025, he presented to the emergency department with more coughing and now with hemoptysis. CTA chest was done showing central, lobar, segmental and subsegmental filling defects to the right left lung. Pulmonary arteries are mildly enlarged. There is a superior segment right lower lobe pulmonary infarction. Bilateral lower extremity venous ultrasound was done showing near occlusive thrombus in the left mid/distal superficial femoral, popliteal and posterior tibial veins. He was admitted briefly and was discharged the same day on rivaroxaban and is referred to  "our clinic for consultation.     6/12/2025, he has a follow up visit with his family practitioner and reports that he is feeling better. He also reports that he has a family history of \"clotting disorder\".     Currently, he is on rivaroxaban at 20 mg PO Qday dosing. He denies any bleeding complications. He denies any shortness of breath and denies any left leg pain or swelling now.     ROS:  Denies any bleeding complications. Specifically, no frequent epistaxis. No issues with oral mucosal bleeding. Denies any hematuria or blood in stools. Denies any shortness of breath. No chest pain. No cough. No fever.      Medications:  Current Outpatient Medications   Medication Sig Dispense Refill    albuterol (PROAIR HFA/PROVENTIL HFA/VENTOLIN HFA) 108 (90 Base) MCG/ACT inhaler Inhale 2 puffs into the lungs every 6 hours as needed for shortness of breath, wheezing or cough. 18 g 0    buPROPion (WELLBUTRIN XL) 300 MG 24 hr tablet Take 1 tablet (300 mg) by mouth every morning. 90 tablet 3    furosemide (LASIX) 80 MG tablet Take 1 tablet (80 mg) by mouth daily. 90 tablet 1    rivaroxaban ANTICOAGULANT (XARELTO) 20 MG TABS tablet Take 1 tablet (20 mg) by mouth daily (with dinner). 60 tablet 0    Rivaroxaban ANTICOAGULANT 15 & 20 MG TBPK Starter Therapy Pack Take 15 mg by mouth 2 times daily (with meals) for 21 days, THEN 20 mg daily with food for 9 days. Continue as directed by provider. 51 each 0     No current facility-administered medications for this visit.     Allergies:  Allergies   Allergen Reactions    Alupent [Metaproterenol] Other (See Comments)     Other reaction(s): *Unknown - Childhood Rxn, Unknown, pt doesn't recall.     PmHx:  Past Medical History:   Diagnosis Date    Pulmonary hypertension (H)        Social History:   As described above. He is , lives with his wife and 2 young children. He is a  at an "[employer].     Family History:  He reports that 2 of his maternal aunts have history " of polycythemia vera for which both had suffered from a stroke in the past with one passed away as a result.   No family history of venous thrombosis.   His father  of pulmonary fibrosis when Ant was 14 years of age.   He has one sister with no history of venous thrombosis.   His mother has a history of atrial fibrillation but no history of venous thrombosis.     Objective:  Vitals: BP (!) 153/88 (BP Location: Right arm)   Pulse 94   Temp 97.4  F (36.3  C) (Tympanic)   Wt 115.4 kg (254 lb 6.4 oz)   SpO2 98%   BMI 32.66 kg/m    Exam:   He has no significant swelling in both lower extremities. He is wearing compression stockings in both legs. No skin discoloration of the pre-tibial areas in both legs to suggest venous stasis / insufficiency.       Labs:  Component      Latest Ref [facility] 2025  6:33 AM   WBC      4.0 - 11.0 10e3/uL 7.1    RBC Count      4.40 - 5.90 10e6/uL 4.16 (L)    Hemoglobin      13.3 - 17.7 g/dL 12.8 (L)    Hematocrit      40.0 - 53.0 % 37.4 (L)    MCV      78 - 100 fL 90    MCH      26.5 - 33.0 pg 30.8    MCHC      31.5 - 36.5 g/dL 34.2    RDW      10.0 - 15.0 % 12.4    Platelet Count      150 - 450 10e3/uL 190    % Neutrophils      % 70    % Lymphocytes      % 15    % Monocytes      % 12    % Eosinophils      % 1    % Basophils      % 0    % Immature Granulocytes      % 0    NRBC/W      <1 /100 0    Absolute Neutrophil      1.6 - 8.3 10e3/uL 5.0    Absolute Lymphocytes      0.8 - 5.3 10e3/uL 1.1    Absolute Monocytes      0.0 - 1.3 10e3/uL 0.9    Absolute Eosinophils      0.0 - 0.7 10e3/uL 0.1    Absolute Basophils      0.0 - 0.2 10e3/uL 0.0    Absolute Immature Granulocytes      <=0.4 10e3/uL 0.0    Absolute NRBCs      10e3/uL 0.0    Sodium      135 - 145 mmol/L 134 (L)    Potassium      3.4 - 5.3 mmol/L 4.0    Chloride      98 - 107 mmol/L 99    Carbon Dioxide (CO2)      22 - 29 mmol/L 29    Anion Gap      7 - 15 mmol/L 6 (L)    Urea Nitrogen      6.0 - 20.0 mg/dL 12.5     Creatinine      0.67 - 1.17 mg/dL 1.04    GFR Estimate      >60 mL/min/1.73m2 89    Calcium      8.8 - 10.4 mg/dL 8.8    Glucose      70 - 99 mg/dL 104 (H)    INR      0.85 - 1.15  1.00    PT      11.8 - 14.8 Seconds 13.5    PTT      22 - 38 Seconds 26       Imaging:  Reviewed and are as described above.     Assessment:  In summary, Ant is a 50 year old male with a history of mood disorder, tobacco use, and methamphetamine use, who is found to have pulmonary embolism and left leg DVT back on 6/5/2025, referred by Dr. Belinda Morales of [facility] for consultation. Currently he is on rivaroxaban at 15 mg PO BID dosing.     Ant's pulmonary embolism and left leg DVT seemingly was an unprovoked event. Ant indicates that he had read some articles stating that methamphetamine use can increase risk of venous thrombosis. This writer did a literature search and although studies and literature on the correlations of methamphetamine use and risks of venous thrombosis are rare, I did find one study comparing VTE incidents in blunt trauma patients who are meth users and non-meth users and found that patients using meth does have a 13.8% greater risk for VTE.     Diagnosis:  Unprovoked pulmonary embolism and left leg DVT found on 6/5/2025. Unclear if methamphetamine use is a contributing factor / provoking factor as literature on this is almost non-existence but conceivable as methamphetamine is known to cause an hypercoagulable state, at least within the arterial system.   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   Long term daily methamphetamine user for at least the past 6 years for which he abruptly stop using back around 6/1/2025 and has not use ever since to this day.    Also he has quit cigarette smoking for the past several months.     Plan:  Today, I spent quite a bit of time to educate Ant on DVT/PE, provoked vs unprovoked venous thromboembolic events, discuss the  differences between venous and arterial thrombosis, and the general approach in regard to anticoagulation therapy and management. I have also answered all his questions to his satisfaction.     I explain to Ant that he will need at least 6 months of anticoagulation therapy from 6/5/2025. He is instructed to continue with rivaroxaban at 15 mg PO BID dosing to complete his 21 days loading dose and then start 20 mg PO Qday thereafter. I have renewed his rivaroxaban prescription today. I will plan to have him get a repeat left leg venous ultrasound in Dec 2025 and then return to see me 1-2 weeks after that repeat ultrasound to discuss ultimate duration of his anticoagulation therapy. If the patient is able to continue to abstain from methamphetamine use, then we can possibly discuss having him stop all anticoagulation therapy after 6 months of therapy.     He supposed to get a right heart cath for evaluation of his pulmonary hypertension. However, this has been postponed due to recent venous thrombosis. I am recommending that he should stay on interrupted anticoagulation therapy for at least 3 months from 6/5/2025 before considering any invasive or surgical procedures that requires him to hold anticoagulation therapy.    Considering that he has a venous thrombotic event and a family history of polycythemia vera, I will check JAK2 mutation today. His hemoglobin has been within normal range in the past making the likelihood that he has polycythemia vera quite low. I will also check cardiolipin Abys and beta 2 glycoprotein Abys.     He is provided with our clinic's contact information and is instructed to call if he should have any further questions or concerns.      Rafi Frausto PA-C, MPAS  Physician Assistant  [facility].     The longitudinal plan of care for these conditions below were addressed during this visit. Due to the added complexity in care, I  will continue to support Jake Rodriguez  in the subsequent management of these conditions and with the ongoing continuity of care of these conditions.    Problem List Items Addressed This Visit as of 2/19/2024   Unprovoked pulmonary embolism and left leg DVT found on 6/5/2025. Unclear if methamphetamine use is a contributing factor / provoking factor as literature on this is almost non-existence but conceivable as methamphetamine is known to cause an hypercoagulable state, at least within the arterial system.   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   Long term daily methamphetamine user for at least the past 6 years for which he abruptly stop using back around 6/1/2025 and has not use ever since to this day.    Also he has quit cigarette smoking for the past several months.       60 minutes spent by me on the date of the encounter doing chart review, history and exam, documentation and further activities per the note    Time IN: 15:38  Time OUT: 16:22

## 2025-06-16 ENCOUNTER — OFFICE VISIT (OUTPATIENT)
Dept: HEMATOLOGY | Facility: CLINIC | Age: 50
End: 2025-06-16
Attending: PHYSICIAN ASSISTANT
Payer: COMMERCIAL

## 2025-06-16 VITALS
HEART RATE: 94 BPM | DIASTOLIC BLOOD PRESSURE: 88 MMHG | WEIGHT: 254.4 LBS | BODY MASS INDEX: 32.66 KG/M2 | OXYGEN SATURATION: 98 % | SYSTOLIC BLOOD PRESSURE: 153 MMHG | TEMPERATURE: 97.4 F

## 2025-06-16 DIAGNOSIS — Z86.711 HISTORY OF PULMONARY EMBOLISM: Primary | ICD-10-CM

## 2025-06-16 DIAGNOSIS — I26.09 ACUTE PULMONARY EMBOLISM WITH ACUTE COR PULMONALE, UNSPECIFIED PULMONARY EMBOLISM TYPE (H): ICD-10-CM

## 2025-06-16 DIAGNOSIS — I82.90 OCCLUSIVE THROMBUS: ICD-10-CM

## 2025-06-16 DIAGNOSIS — Z86.718 PERSONAL HISTORY OF DVT (DEEP VEIN THROMBOSIS): ICD-10-CM

## 2025-06-16 PROCEDURE — 86147 CARDIOLIPIN ANTIBODY EA IG: CPT | Performed by: PHYSICIAN ASSISTANT

## 2025-06-16 PROCEDURE — 99213 OFFICE O/P EST LOW 20 MIN: CPT | Performed by: PHYSICIAN ASSISTANT

## 2025-06-16 PROCEDURE — 81339 MPL GENE SEQ ALYS EXON 10: CPT | Performed by: PHYSICIAN ASSISTANT

## 2025-06-16 PROCEDURE — 36415 COLL VENOUS BLD VENIPUNCTURE: CPT | Performed by: PHYSICIAN ASSISTANT

## 2025-06-16 PROCEDURE — 3079F DIAST BP 80-89 MM HG: CPT | Performed by: PHYSICIAN ASSISTANT

## 2025-06-16 PROCEDURE — 99205 OFFICE O/P NEW HI 60 MIN: CPT | Performed by: PHYSICIAN ASSISTANT

## 2025-06-16 PROCEDURE — 86146 BETA-2 GLYCOPROTEIN ANTIBODY: CPT | Performed by: PHYSICIAN ASSISTANT

## 2025-06-16 PROCEDURE — G2211 COMPLEX E/M VISIT ADD ON: HCPCS | Performed by: PHYSICIAN ASSISTANT

## 2025-06-16 PROCEDURE — 3077F SYST BP >= 140 MM HG: CPT | Performed by: PHYSICIAN ASSISTANT

## 2025-06-16 PROCEDURE — 81270 JAK2 GENE: CPT | Performed by: PHYSICIAN ASSISTANT

## 2025-06-16 NOTE — LETTER
"          [facility]  [address] [place] [address], [place] [address]  Main: [phone], Fax: [phone]    Patient seen at: [facility] at [address] [place] [address]    Outpatient Visit Note:    Patient: Jake Rodriguez  MRN: [id number]  : 1975  KATERYNA: 2025  Location of this writer at the time of this clinic visit was conducted: [facility].  Location of the patient at the time of this clinic visit was conducted: [facility].     Reason for visit:  Pulmonary embolism and left leg DVT on 2025.     HPI:  Ant is a 50 year old male with a history of mood disorder, tobacco use, and methamphetamine use, who is found to have pulmonary embolism and left leg DVT back on 2025, referred by Dr. Belinda Morales of [facility] for consultation. Currently he is on rivaroxaban at 15 mg PO BID dosing.     Ant has a long standing history of using methamphetamine for which in the recent years, he has been seen by Dr. 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 In fact, Ant reports that he used methamphetamine when he was in his 20s, then had about 10 years of sobriety but then restarted the habit about 6 years ago where he uses daily. Then about a week prior to his 2025 emergency department evaluation, he decided to quit \"cold turkey\" and has not used any methamphetamine since and he reportedly has done very well without any withdrawal symptoms. He also has recently quit smoking cigarettes.     Ant reports that when he used to use methamphetamine, he generally smokes it and in the past 6 years, he has not inject meth intravenously. He reports that he also has long standing history of bilateral leg pain with left leg more than his right. " His pain comes and goes and at times, his leg pain is so severe that they woke him up from sleep. He reports that he had undergone extensive workup in the past in regard to his leg pain without any significant findings. However for the past several months, his leg pain has not recur and especially after he was started on anticoagulation therapy after his VTE event on 6/5/2025, his leg pain seems to have completely resolved.     2/20/2025, he had a CT chest done for shortness of breath on exertion and hypertension. This CT showed no evidence of acute or chronic pulmonary embolism. Mild upper lung predominant dense fibrotic abnormality. Calcified mediastinal and hilar nodes. Findings may represent sequela of prior granulomatous infection or inflammatory process.     On 5/19/2025, he was seen at the emergency department with cough and nasal congestion for about 2 weeks. CXR showed chronic scarring in the right upper lobe, lungs otherwise clear. He was diagnosed with bronchitis and given inhaler, prednisone and antibiotics.     He initially felt improvement with his symptoms but then he started to have a cough once again. Although he felt ill between 5/19/2025 to 6/5/2025, he kept himself active and continued to go to work as a  at [employer].     Then around 6/1/2025, he recalls that he stopped using methamphetamine on his own and has not used ever since.    6/5/2025, he presented to the emergency department with more coughing and now with hemoptysis. CTA chest was done showing central, lobar, segmental and subsegmental filling defects to the right left lung. Pulmonary arteries are mildly enlarged. There is a superior segment right lower lobe pulmonary infarction. Bilateral lower extremity venous ultrasound was done showing near occlusive thrombus in the left mid/distal superficial femoral, popliteal and posterior tibial veins. He was admitted briefly and was discharged the same day on rivaroxaban and is  "referred to our clinic for consultation.     6/12/2025, he has a follow up visit with his family practitioner and reports that he is feeling better. He also reports that he has a family history of \"clotting disorder\".     Currently, he is on rivaroxaban at 20 mg PO Qday dosing. He denies any bleeding complications. He denies any shortness of breath and denies any left leg pain or swelling now.     ROS:  Denies any bleeding complications. Specifically, no frequent epistaxis. No issues with oral mucosal bleeding. Denies any hematuria or blood in stools. Denies any shortness of breath. No chest pain. No cough. No fever.      Medications:  Current Outpatient Medications   Medication Sig Dispense Refill     albuterol (PROAIR HFA/PROVENTIL HFA/VENTOLIN HFA) 108 (90 Base) MCG/ACT inhaler Inhale 2 puffs into the lungs every 6 hours as needed for shortness of breath, wheezing or cough. 18 g 0     buPROPion (WELLBUTRIN XL) 300 MG 24 hr tablet Take 1 tablet (300 mg) by mouth every morning. 90 tablet 3     furosemide (LASIX) 80 MG tablet Take 1 tablet (80 mg) by mouth daily. 90 tablet 1     rivaroxaban ANTICOAGULANT (XARELTO) 20 MG TABS tablet Take 1 tablet (20 mg) by mouth daily (with dinner). 60 tablet 0     Rivaroxaban ANTICOAGULANT 15 & 20 MG TBPK Starter Therapy Pack Take 15 mg by mouth 2 times daily (with meals) for 21 days, THEN 20 mg daily with food for 9 days. Continue as directed by provider. 51 each 0     No current facility-administered medications for this visit.     Allergies:  Allergies   Allergen Reactions     Alupent [Metaproterenol] Other (See Comments)     Other reaction(s): *Unknown - Childhood Rxn, Unknown, pt doesn't recall.     PmHx:  Past Medical History:   Diagnosis Date     Pulmonary hypertension (H)        Social History:   As described above. He is , lives with his wife and 2 young children. He is a  at an [employer].     Family History:  He reports that 2 of his maternal " aunts have history of polycythemia vera for which both had suffered from a stroke in the past with one passed away as a result.   No family history of venous thrombosis.   His father  of pulmonary fibrosis when Ant was 14 years of age.   He has one sister with no history of venous thrombosis.   His mother has a history of atrial fibrillation but no history of venous thrombosis.     Objective:  Vitals: BP (!) 153/88 (BP Location: Right arm)   Pulse 94   Temp 97.4  F (36.3  C) (Tympanic)   Wt 115.4 kg (254 lb 6.4 oz)   SpO2 98%   BMI 32.66 kg/m    Exam:   He has no significant swelling in both lower extremities. He is wearing compression stockings in both legs. No skin discoloration of the pre-tibial areas in both legs to suggest venous stasis / insufficiency.       Labs:  Component      Latest Ref [facility] 2025  6:33 AM   WBC      4.0 - 11.0 10e3/uL 7.1    RBC Count      4.40 - 5.90 10e6/uL 4.16 (L)    Hemoglobin      13.3 - 17.7 g/dL 12.8 (L)    Hematocrit      40.0 - 53.0 % 37.4 (L)    MCV      78 - 100 fL 90    MCH      26.5 - 33.0 pg 30.8    MCHC      31.5 - 36.5 g/dL 34.2    RDW      10.0 - 15.0 % 12.4    Platelet Count      150 - 450 10e3/uL 190    % Neutrophils      % 70    % Lymphocytes      % 15    % Monocytes      % 12    % Eosinophils      % 1    % Basophils      % 0    % Immature Granulocytes      % 0    NRBC/W      <1 /100 0    Absolute Neutrophil      1.6 - 8.3 10e3/uL 5.0    Absolute Lymphocytes      0.8 - 5.3 10e3/uL 1.1    Absolute Monocytes      0.0 - 1.3 10e3/uL 0.9    Absolute Eosinophils      0.0 - 0.7 10e3/uL 0.1    Absolute Basophils      0.0 - 0.2 10e3/uL 0.0    Absolute Immature Granulocytes      <=0.4 10e3/uL 0.0    Absolute NRBCs      10e3/uL 0.0    Sodium      135 - 145 mmol/L 134 (L)    Potassium      3.4 - 5.3 mmol/L 4.0    Chloride      98 - 107 mmol/L 99    Carbon Dioxide (CO2)      22 - 29 mmol/L 29    Anion Gap      7 - 15 mmol/L 6 (L)    Urea Nitrogen      6.0 - 20.0 mg/dL  12.5    Creatinine      0.67 - 1.17 mg/dL 1.04    GFR Estimate      >60 mL/min/1.73m2 89    Calcium      8.8 - 10.4 mg/dL 8.8    Glucose      70 - 99 mg/dL 104 (H)    INR      0.85 - 1.15  1.00    PT      11.8 - 14.8 Seconds 13.5    PTT      22 - 38 Seconds 26       Imaging:  Reviewed and are as described above.     Assessment:  In summary, Ant is a 50 year old male with a history of mood disorder, tobacco use, and methamphetamine use, who is found to have pulmonary embolism and left leg DVT back on 6/5/2025, referred by Dr. Belinda Morales of [facility] for consultation. Currently he is on rivaroxaban at 15 mg PO BID dosing.     Ant's pulmonary embolism and left leg DVT seemingly was an unprovoked event. Ant indicates that he had read some articles stating that methamphetamine use can increase risk of venous thrombosis. This writer did a literature search and although studies and literature on the correlations of methamphetamine use and risks of venous thrombosis are rare, I did find one study comparing VTE incidents in blunt trauma patients who are meth users and non-meth users and found that patients using meth does have a 13.8% greater risk for VTE.     Diagnosis:  Unprovoked pulmonary embolism and left leg DVT found on 6/5/2025. Unclear if methamphetamine use is a contributing factor / provoking factor as literature on this is almost non-existence but conceivable as methamphetamine is known to cause an hypercoagulable state, at least within the arterial system.   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   Long term daily methamphetamine user for at least the past 6 years for which he abruptly stop using back around 6/1/2025 and has not use ever since to this day.    Also he has quit cigarette smoking for the past several months.     Plan:  Today, I spent quite a bit of time to educate Ant on DVT/PE, provoked vs unprovoked venous thromboembolic events, discuss the  differences between venous and arterial thrombosis, and the general approach in regard to anticoagulation therapy and management. I have also answered all his questions to his satisfaction.     I explain to Ant that he will need at least 6 months of anticoagulation therapy from 6/5/2025. He is instructed to continue with rivaroxaban at 15 mg PO BID dosing to complete his 21 days loading dose and then start 20 mg PO Qday thereafter. I have renewed his rivaroxaban prescription today. I will plan to have him get a repeat left leg venous ultrasound in Dec 2025 and then return to see me 1-2 weeks after that repeat ultrasound to discuss ultimate duration of his anticoagulation therapy. If the patient is able to continue to abstain from methamphetamine use, then we can possibly discuss having him stop all anticoagulation therapy after 6 months of therapy.     He supposed to get a right heart cath for evaluation of his pulmonary hypertension. However, this has been postponed due to recent venous thrombosis. I am recommending that he should stay on interrupted anticoagulation therapy for at least 3 months from 6/5/2025 before considering any invasive or surgical procedures that requires him to hold anticoagulation therapy.    Considering that he has a venous thrombotic event and a family history of polycythemia vera, I will check JAK2 mutation today. His hemoglobin has been within normal range in the past making the likelihood that he has polycythemia vera quite low. I will also check cardiolipin Abys and beta 2 glycoprotein Abys.     He is provided with our clinic's contact information and is instructed to call if he should have any further questions or concerns.      Rafi Frausto PA-C, MPAS  Physician Assistant  [facility].     The longitudinal plan of care for these conditions below were addressed during this visit. Due to the added complexity in care, I  will continue to support Jake Rodriguez  in the subsequent management of these conditions and with the ongoing continuity of care of these conditions.    Problem List Items Addressed This Visit as of 2/19/2024   Unprovoked pulmonary embolism and left leg DVT found on 6/5/2025. Unclear if methamphetamine use is a contributing factor / provoking factor as literature on this is almost non-existence but conceivable as methamphetamine is known to cause an hypercoagulable state, at least within the arterial system.   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   Long term daily methamphetamine user for at least the past 6 years for which he abruptly stop using back around 6/1/2025 and has not use ever since to this day.    Also he has quit cigarette smoking for the past several months.       60 minutes spent by me on the date of the encounter doing chart review, history and exam, documentation and further activities per the note    Time IN: 15:38  Time OUT: 16:22

## 2025-06-17 LAB
B2 GLYCOPROT1 IGG SERPL IA-ACNC: 0.9 U/ML
B2 GLYCOPROT1 IGM SERPL IA-ACNC: <2.4 U/ML
CARDIOLIPIN IGG SER IA-ACNC: 3.2 GPL-U/ML
CARDIOLIPIN IGG SER IA-ACNC: NEGATIVE
CARDIOLIPIN IGM SER IA-ACNC: <2 MPL-U/ML
CARDIOLIPIN IGM SER IA-ACNC: NEGATIVE

## 2025-06-18 ENCOUNTER — DOCUMENTATION ONLY (OUTPATIENT)
Dept: ANTICOAGULATION | Facility: CLINIC | Age: 50
End: 2025-06-18
Payer: COMMERCIAL

## 2025-06-18 NOTE — PROGRESS NOTES
Anticoagulant Therapeutic Duplication    Duplicate orders identified: same medication but different dose, form, frequency or route    Duplicate orders appropriate-has starter pack order and ongoing therapy order     Active anticoagulant: rivaroxaban (Xarelto)    Plan made per [facility] anticoagulation protocol.    Glen Kumar RN  6/18/2025

## 2025-06-20 ENCOUNTER — RESULTS FOLLOW-UP (OUTPATIENT)
Dept: HEMATOLOGY | Facility: CLINIC | Age: 50
End: 2025-06-20

## 2025-06-23 ENCOUNTER — TELEPHONE (OUTPATIENT)
Dept: FAMILY MEDICINE | Facility: CLINIC | Age: 50
End: 2025-06-23
Payer: COMMERCIAL

## 2025-06-23 NOTE — TELEPHONE ENCOUNTER
General Call      Reason for Call:  order for colonoscopy    What are your questions or concerns:  Pt scheduled a visit to receive an order for colonoscopy. There is an order on file from November 2024. He will call [facility] to schedule.    Date of last appointment with provider: 612/25

## 2025-06-24 LAB
INTERPRETATION SERPL IEP-IMP: NORMAL
LAB TEST RESULTS REPORTED IN RPTPERIOD: NORMAL
LOCATION OF TASK: NORMAL
SEQUENCING METHOD PNL BLD/T: NORMAL
SPECIMEN TYPE: NORMAL

## 2025-07-02 ENCOUNTER — TELEPHONE (OUTPATIENT)
Dept: HEMATOLOGY | Facility: CLINIC | Age: 50
End: 2025-07-02
Payer: COMMERCIAL

## 2025-07-02 NOTE — CONFIDENTIAL NOTE
"[id number]  Jake Rodriguez  50 year old male  [facility] Diagnosis: DVT in June of 2025.  [facility] Provider: JAMIE Frausto    Incoming call from Ant who reports blood tinged sputum the last two mornings. He only coughs this up in the morning and then doesn't have any other sputum the rest of the day. The blood tinge is a dark red and is a small amount. Ant states he has never had this before and wonders if his \"lungs are bleeding\". Attempted to provide some reassurance that a small amount of blood tinge with morning sputum is not concerning but that I would relay message to Rafi Frausto PA-C to get his thoughts.    Robe PETIT RN  [facility]  Office: [phone]  Clinic: [phone]  Fax: [phone]    "

## 2025-07-02 NOTE — CONFIDENTIAL NOTE
Called and left voicemail for Ant with the below recommendations.    Robe PETIT RN  [facility]  Office: [phone]  Clinic: [phone]  Fax: [phone]

## 2025-08-15 ENCOUNTER — TELEPHONE (OUTPATIENT)
Dept: CARDIOLOGY | Facility: CLINIC | Age: 50
End: 2025-08-15
Payer: COMMERCIAL

## 2025-08-19 ENCOUNTER — DOCUMENTATION ONLY (OUTPATIENT)
Dept: ANTICOAGULATION | Facility: CLINIC | Age: 50
End: 2025-08-19
Payer: COMMERCIAL

## 2025-08-26 ENCOUNTER — MYC MEDICAL ADVICE (OUTPATIENT)
Dept: FAMILY MEDICINE | Facility: CLINIC | Age: 50
End: 2025-08-26
Payer: COMMERCIAL